# Patient Record
Sex: MALE | Race: WHITE | NOT HISPANIC OR LATINO | Employment: OTHER | URBAN - METROPOLITAN AREA
[De-identification: names, ages, dates, MRNs, and addresses within clinical notes are randomized per-mention and may not be internally consistent; named-entity substitution may affect disease eponyms.]

---

## 2020-11-06 ENCOUNTER — HOSPITAL ENCOUNTER (EMERGENCY)
Facility: CLINIC | Age: 70
Discharge: HOME | DRG: 225 | End: 2020-11-06
Attending: EMERGENCY MEDICINE | Admitting: EMERGENCY MEDICINE
Payer: MEDICARE

## 2020-11-06 ENCOUNTER — HOSPITAL ENCOUNTER (INPATIENT)
Facility: CLINIC | Age: 70
LOS: 3 days | Discharge: HOME | DRG: 225 | End: 2020-11-09
Attending: EMERGENCY MEDICINE | Admitting: HOSPITALIST
Payer: MEDICARE

## 2020-11-06 DIAGNOSIS — I49.9 VENTRICULAR ARRHYTHMIA: ICD-10-CM

## 2020-11-06 DIAGNOSIS — Z96.651 STATUS POST TOTAL RIGHT KNEE REPLACEMENT: Primary | ICD-10-CM

## 2020-11-06 DIAGNOSIS — Z53.9 ERRONEOUS ENCOUNTER--DISREGARD: ICD-10-CM

## 2020-11-06 LAB
ANION GAP SERPL CALCULATED.3IONS-SCNC: 6 MMOL/L (ref 3–14)
APTT PPP: 44 SEC (ref 22–37)
APTT PPP: 46 SEC (ref 22–37)
APTT PPP: 66 SEC (ref 22–37)
BASOPHILS # BLD AUTO: 0 10E9/L (ref 0–0.2)
BASOPHILS NFR BLD AUTO: 0.2 %
BUN SERPL-MCNC: 13 MG/DL (ref 7–30)
CALCIUM SERPL-MCNC: 8.6 MG/DL (ref 8.5–10.1)
CHLORIDE SERPL-SCNC: 106 MMOL/L (ref 94–109)
CO2 BLDCOV-SCNC: 25 MMOL/L (ref 21–28)
CO2 SERPL-SCNC: 25 MMOL/L (ref 20–32)
CREAT BLD-MCNC: 1 MG/DL (ref 0.66–1.25)
CREAT SERPL-MCNC: 1 MG/DL (ref 0.66–1.25)
DIFFERENTIAL METHOD BLD: ABNORMAL
EOSINOPHIL # BLD AUTO: 0.1 10E9/L (ref 0–0.7)
EOSINOPHIL NFR BLD AUTO: 1.4 %
ERYTHROCYTE [DISTWIDTH] IN BLOOD BY AUTOMATED COUNT: 12.7 % (ref 10–15)
ERYTHROCYTE [DISTWIDTH] IN BLOOD BY AUTOMATED COUNT: 12.9 % (ref 10–15)
GFR SERPL CREATININE-BSD FRML MDRD: 74 ML/MIN/{1.73_M2}
GFR SERPL CREATININE-BSD FRML MDRD: 76 ML/MIN/{1.73_M2}
GLUCOSE SERPL-MCNC: 187 MG/DL (ref 70–99)
HCT VFR BLD AUTO: 34.8 % (ref 40–53)
HCT VFR BLD AUTO: 34.9 % (ref 40–53)
HCT VFR BLD CALC: 34 %PCV (ref 40–53)
HGB BLD CALC-MCNC: 11.6 G/DL (ref 13.3–17.7)
HGB BLD-MCNC: 11.3 G/DL (ref 13.3–17.7)
HGB BLD-MCNC: 11.4 G/DL (ref 13.3–17.7)
IMM GRANULOCYTES # BLD: 0 10E9/L (ref 0–0.4)
IMM GRANULOCYTES NFR BLD: 0.3 %
INR PPP: 1.75 (ref 0.86–1.14)
INTERPRETATION ECG - MUSE: NORMAL
INTERPRETATION ECG - MUSE: NORMAL
LABORATORY COMMENT REPORT: NORMAL
LACTATE BLD-SCNC: 1.9 MMOL/L (ref 0.7–2)
LYMPHOCYTES # BLD AUTO: 1.3 10E9/L (ref 0.8–5.3)
LYMPHOCYTES NFR BLD AUTO: 13.4 %
MAGNESIUM SERPL-MCNC: 1.9 MG/DL (ref 1.6–2.3)
MCH RBC QN AUTO: 31.7 PG (ref 26.5–33)
MCH RBC QN AUTO: 31.8 PG (ref 26.5–33)
MCHC RBC AUTO-ENTMCNC: 32.4 G/DL (ref 31.5–36.5)
MCHC RBC AUTO-ENTMCNC: 32.8 G/DL (ref 31.5–36.5)
MCV RBC AUTO: 97 FL (ref 78–100)
MCV RBC AUTO: 98 FL (ref 78–100)
MONOCYTES # BLD AUTO: 0.9 10E9/L (ref 0–1.3)
MONOCYTES NFR BLD AUTO: 9.9 %
NEUTROPHILS # BLD AUTO: 7 10E9/L (ref 1.6–8.3)
NEUTROPHILS NFR BLD AUTO: 74.8 %
NRBC # BLD AUTO: 0 10*3/UL
NRBC BLD AUTO-RTO: 0 /100
PCO2 BLDV: 41 MM HG (ref 40–50)
PH BLDV: 7.39 PH (ref 7.32–7.43)
PLATELET # BLD AUTO: 363 10E9/L (ref 150–450)
PLATELET # BLD AUTO: 376 10E9/L (ref 150–450)
PO2 BLDV: 27 MM HG (ref 25–47)
POTASSIUM BLD-SCNC: 3.8 MMOL/L (ref 3.4–5.3)
POTASSIUM SERPL-SCNC: 3.8 MMOL/L (ref 3.4–5.3)
RBC # BLD AUTO: 3.56 10E12/L (ref 4.4–5.9)
RBC # BLD AUTO: 3.58 10E12/L (ref 4.4–5.9)
SAO2 % BLDV FROM PO2: 49 %
SARS-COV-2 RNA SPEC QL NAA+PROBE: NEGATIVE
SARS-COV-2 RNA SPEC QL NAA+PROBE: NORMAL
SODIUM BLD-SCNC: 140 MMOL/L (ref 133–144)
SODIUM SERPL-SCNC: 137 MMOL/L (ref 133–144)
SPECIMEN SOURCE: NORMAL
SPECIMEN SOURCE: NORMAL
TROPONIN I BLD-MCNC: 0 UG/L (ref 0–0.08)
TROPONIN I SERPL-MCNC: 0.03 UG/L (ref 0–0.04)
TROPONIN I SERPL-MCNC: 1.22 UG/L (ref 0–0.04)
TROPONIN I SERPL-MCNC: 1.33 UG/L (ref 0–0.04)
WBC # BLD AUTO: 8.9 10E9/L (ref 4–11)
WBC # BLD AUTO: 9.4 10E9/L (ref 4–11)

## 2020-11-06 PROCEDURE — 999N001076 HC STATISTIC SODIUM ED POCT

## 2020-11-06 PROCEDURE — 99223 1ST HOSP IP/OBS HIGH 75: CPT | Mod: AI | Performed by: HOSPITALIST

## 2020-11-06 PROCEDURE — 210N000002 HC R&B HEART CARE

## 2020-11-06 PROCEDURE — 99292 CRITICAL CARE ADDL 30 MIN: CPT

## 2020-11-06 PROCEDURE — 999N001079 HC STATISTIC HEMATOCRIT ED POCT

## 2020-11-06 PROCEDURE — 96361 HYDRATE IV INFUSION ADD-ON: CPT

## 2020-11-06 PROCEDURE — 250N000011 HC RX IP 250 OP 636: Performed by: HOSPITALIST

## 2020-11-06 PROCEDURE — 250N000013 HC RX MED GY IP 250 OP 250 PS 637: Performed by: HOSPITALIST

## 2020-11-06 PROCEDURE — 96366 THER/PROPH/DIAG IV INF ADDON: CPT

## 2020-11-06 PROCEDURE — 83735 ASSAY OF MAGNESIUM: CPT | Performed by: EMERGENCY MEDICINE

## 2020-11-06 PROCEDURE — 82803 BLOOD GASES ANY COMBINATION: CPT

## 2020-11-06 PROCEDURE — 99221 1ST HOSP IP/OBS SF/LOW 40: CPT | Performed by: INTERNAL MEDICINE

## 2020-11-06 PROCEDURE — 85610 PROTHROMBIN TIME: CPT | Performed by: EMERGENCY MEDICINE

## 2020-11-06 PROCEDURE — C9803 HOPD COVID-19 SPEC COLLECT: HCPCS

## 2020-11-06 PROCEDURE — 93005 ELECTROCARDIOGRAM TRACING: CPT | Mod: 76

## 2020-11-06 PROCEDURE — 85027 COMPLETE CBC AUTOMATED: CPT | Performed by: HOSPITALIST

## 2020-11-06 PROCEDURE — 84484 ASSAY OF TROPONIN QUANT: CPT | Performed by: EMERGENCY MEDICINE

## 2020-11-06 PROCEDURE — 80048 BASIC METABOLIC PNL TOTAL CA: CPT | Performed by: EMERGENCY MEDICINE

## 2020-11-06 PROCEDURE — 250N000011 HC RX IP 250 OP 636: Performed by: EMERGENCY MEDICINE

## 2020-11-06 PROCEDURE — 96360 HYDRATION IV INFUSION INIT: CPT | Mod: 59

## 2020-11-06 PROCEDURE — 999N001077 HC STATISTIC POTASSIUM ED POCT

## 2020-11-06 PROCEDURE — 36415 COLL VENOUS BLD VENIPUNCTURE: CPT | Performed by: HOSPITALIST

## 2020-11-06 PROCEDURE — 96365 THER/PROPH/DIAG IV INF INIT: CPT

## 2020-11-06 PROCEDURE — 999N000157 HC STATISTIC RCP TIME EA 10 MIN

## 2020-11-06 PROCEDURE — 258N000003 HC RX IP 258 OP 636: Performed by: EMERGENCY MEDICINE

## 2020-11-06 PROCEDURE — 258N000003 HC RX IP 258 OP 636: Performed by: HOSPITALIST

## 2020-11-06 PROCEDURE — 82565 ASSAY OF CREATININE: CPT

## 2020-11-06 PROCEDURE — 99291 CRITICAL CARE FIRST HOUR: CPT

## 2020-11-06 PROCEDURE — U0003 INFECTIOUS AGENT DETECTION BY NUCLEIC ACID (DNA OR RNA); SEVERE ACUTE RESPIRATORY SYNDROME CORONAVIRUS 2 (SARS-COV-2) (CORONAVIRUS DISEASE [COVID-19]), AMPLIFIED PROBE TECHNIQUE, MAKING USE OF HIGH THROUGHPUT TECHNOLOGIES AS DESCRIBED BY CMS-2020-01-R: HCPCS | Performed by: EMERGENCY MEDICINE

## 2020-11-06 PROCEDURE — 84484 ASSAY OF TROPONIN QUANT: CPT

## 2020-11-06 PROCEDURE — 83605 ASSAY OF LACTIC ACID: CPT | Performed by: EMERGENCY MEDICINE

## 2020-11-06 PROCEDURE — 85025 COMPLETE CBC W/AUTO DIFF WBC: CPT | Performed by: EMERGENCY MEDICINE

## 2020-11-06 PROCEDURE — 84484 ASSAY OF TROPONIN QUANT: CPT | Performed by: HOSPITALIST

## 2020-11-06 PROCEDURE — 85730 THROMBOPLASTIN TIME PARTIAL: CPT | Performed by: EMERGENCY MEDICINE

## 2020-11-06 PROCEDURE — 85730 THROMBOPLASTIN TIME PARTIAL: CPT | Performed by: HOSPITALIST

## 2020-11-06 PROCEDURE — 93005 ELECTROCARDIOGRAM TRACING: CPT

## 2020-11-06 RX ORDER — MIRTAZAPINE 7.5 MG/1
7.5 TABLET, FILM COATED ORAL
COMMUNITY
Start: 2020-10-14

## 2020-11-06 RX ORDER — CARVEDILOL 25 MG/1
25 TABLET ORAL DAILY
Status: DISCONTINUED | OUTPATIENT
Start: 2020-11-07 | End: 2020-11-06

## 2020-11-06 RX ORDER — NITROGLYCERIN 0.4 MG/1
0.4 TABLET SUBLINGUAL EVERY 5 MIN PRN
Status: DISCONTINUED | OUTPATIENT
Start: 2020-11-06 | End: 2020-11-09 | Stop reason: HOSPADM

## 2020-11-06 RX ORDER — LATANOPROST 50 UG/ML
1 SOLUTION/ DROPS OPHTHALMIC AT BEDTIME
Status: DISCONTINUED | OUTPATIENT
Start: 2020-11-06 | End: 2020-11-09 | Stop reason: HOSPADM

## 2020-11-06 RX ORDER — POTASSIUM CHLORIDE 1500 MG/1
20 TABLET, EXTENDED RELEASE ORAL ONCE
Status: COMPLETED | OUTPATIENT
Start: 2020-11-06 | End: 2020-11-06

## 2020-11-06 RX ORDER — POLYETHYLENE GLYCOL 3350 17 G/17G
17 POWDER, FOR SOLUTION ORAL DAILY PRN
Status: DISCONTINUED | OUTPATIENT
Start: 2020-11-06 | End: 2020-11-09 | Stop reason: HOSPADM

## 2020-11-06 RX ORDER — IPRATROPIUM BROMIDE 42 UG/1
2 SPRAY, METERED NASAL 3 TIMES DAILY PRN
COMMUNITY

## 2020-11-06 RX ORDER — UBIDECARENONE 100 MG
100 CAPSULE ORAL AT BEDTIME
COMMUNITY

## 2020-11-06 RX ORDER — CHLORAL HYDRATE 500 MG
2 CAPSULE ORAL DAILY
COMMUNITY

## 2020-11-06 RX ORDER — LORAZEPAM 1 MG/1
TABLET ORAL
COMMUNITY
Start: 2020-10-14

## 2020-11-06 RX ORDER — FLUTICASONE PROPIONATE 50 MCG
2 SPRAY, SUSPENSION (ML) NASAL
COMMUNITY
Start: 2020-02-19 | End: 2020-11-06

## 2020-11-06 RX ORDER — LOSARTAN POTASSIUM 50 MG/1
50 TABLET ORAL DAILY
Status: DISCONTINUED | OUTPATIENT
Start: 2020-11-07 | End: 2020-11-09 | Stop reason: HOSPADM

## 2020-11-06 RX ORDER — NITROGLYCERIN 0.4 MG/1
0.4 TABLET SUBLINGUAL
COMMUNITY
Start: 2019-01-03

## 2020-11-06 RX ORDER — LATANOPROST 50 UG/ML
1 SOLUTION/ DROPS OPHTHALMIC AT BEDTIME
COMMUNITY

## 2020-11-06 RX ORDER — CARBOXYMETHYLCELLULOSE SODIUM 5 MG/ML
2 SOLUTION/ DROPS OPHTHALMIC DAILY
Status: DISCONTINUED | OUTPATIENT
Start: 2020-11-06 | End: 2020-11-09 | Stop reason: HOSPADM

## 2020-11-06 RX ORDER — AMOXICILLIN 250 MG
1 CAPSULE ORAL 2 TIMES DAILY PRN
Status: DISCONTINUED | OUTPATIENT
Start: 2020-11-06 | End: 2020-11-09 | Stop reason: HOSPADM

## 2020-11-06 RX ORDER — ATORVASTATIN CALCIUM 80 MG/1
80 TABLET, FILM COATED ORAL EVERY EVENING
Status: DISCONTINUED | OUTPATIENT
Start: 2020-11-06 | End: 2020-11-09 | Stop reason: HOSPADM

## 2020-11-06 RX ORDER — ACETAMINOPHEN 650 MG/1
650 SUPPOSITORY RECTAL EVERY 4 HOURS PRN
Status: DISCONTINUED | OUTPATIENT
Start: 2020-11-06 | End: 2020-11-09 | Stop reason: HOSPADM

## 2020-11-06 RX ORDER — CARVEDILOL 25 MG/1
25 TABLET ORAL 2 TIMES DAILY WITH MEALS
Status: DISCONTINUED | OUTPATIENT
Start: 2020-11-06 | End: 2020-11-09 | Stop reason: HOSPADM

## 2020-11-06 RX ORDER — ASPIRIN 81 MG/1
81 TABLET ORAL DAILY
Status: DISCONTINUED | OUTPATIENT
Start: 2020-11-07 | End: 2020-11-09 | Stop reason: HOSPADM

## 2020-11-06 RX ORDER — ACETAMINOPHEN 325 MG/1
650 TABLET ORAL EVERY 4 HOURS PRN
Status: DISCONTINUED | OUTPATIENT
Start: 2020-11-06 | End: 2020-11-07

## 2020-11-06 RX ORDER — MULTIVIT WITH MINERALS/LUTEIN
2000 TABLET ORAL DAILY
COMMUNITY

## 2020-11-06 RX ORDER — LANOLIN ALCOHOL/MO/W.PET/CERES
2000 CREAM (GRAM) TOPICAL DAILY
COMMUNITY

## 2020-11-06 RX ORDER — LOSARTAN POTASSIUM 50 MG/1
50 TABLET ORAL
COMMUNITY
Start: 2020-09-18

## 2020-11-06 RX ORDER — IPRATROPIUM BROMIDE 42 UG/1
2 SPRAY, METERED NASAL 3 TIMES DAILY PRN
Status: DISCONTINUED | OUTPATIENT
Start: 2020-11-06 | End: 2020-11-09 | Stop reason: HOSPADM

## 2020-11-06 RX ORDER — AMLODIPINE BESYLATE 5 MG/1
5 TABLET ORAL DAILY
Status: DISCONTINUED | OUTPATIENT
Start: 2020-11-07 | End: 2020-11-08

## 2020-11-06 RX ORDER — AMLODIPINE BESYLATE 5 MG/1
5 TABLET ORAL
COMMUNITY
Start: 2020-09-18

## 2020-11-06 RX ORDER — ONDANSETRON 4 MG/1
4 TABLET, ORALLY DISINTEGRATING ORAL EVERY 6 HOURS PRN
Status: DISCONTINUED | OUTPATIENT
Start: 2020-11-06 | End: 2020-11-09 | Stop reason: HOSPADM

## 2020-11-06 RX ORDER — CARVEDILOL 25 MG/1
25 TABLET ORAL
COMMUNITY
Start: 2020-09-18

## 2020-11-06 RX ORDER — NALOXONE HYDROCHLORIDE 0.4 MG/ML
.1-.4 INJECTION, SOLUTION INTRAMUSCULAR; INTRAVENOUS; SUBCUTANEOUS
Status: DISCONTINUED | OUTPATIENT
Start: 2020-11-06 | End: 2020-11-09 | Stop reason: HOSPADM

## 2020-11-06 RX ORDER — HEPARIN SODIUM 10000 [USP'U]/100ML
0-5000 INJECTION, SOLUTION INTRAVENOUS CONTINUOUS
Status: DISCONTINUED | OUTPATIENT
Start: 2020-11-06 | End: 2020-11-07

## 2020-11-06 RX ORDER — ACETAMINOPHEN 500 MG
500-1000 TABLET ORAL 3 TIMES DAILY PRN
COMMUNITY

## 2020-11-06 RX ORDER — MAGNESIUM OXIDE 400 MG/1
400 TABLET ORAL EVERY EVENING
Status: DISCONTINUED | OUTPATIENT
Start: 2020-11-06 | End: 2020-11-09 | Stop reason: HOSPADM

## 2020-11-06 RX ORDER — OMEPRAZOLE 40 MG/1
40 CAPSULE, DELAYED RELEASE ORAL
COMMUNITY
Start: 2020-10-14

## 2020-11-06 RX ORDER — OXYCODONE HYDROCHLORIDE 5 MG/1
5 TABLET ORAL 3 TIMES DAILY PRN
COMMUNITY

## 2020-11-06 RX ORDER — CARBOXYMETHYLCELLULOSE SODIUM 5 MG/ML
2 SOLUTION/ DROPS OPHTHALMIC DAILY
COMMUNITY

## 2020-11-06 RX ORDER — MULTIVITAMIN,THERAPEUTIC
1 TABLET ORAL DAILY
COMMUNITY

## 2020-11-06 RX ORDER — LORAZEPAM 0.5 MG/1
0.5 TABLET ORAL
Status: DISCONTINUED | OUTPATIENT
Start: 2020-11-06 | End: 2020-11-09 | Stop reason: HOSPADM

## 2020-11-06 RX ORDER — OXYCODONE HYDROCHLORIDE 5 MG/1
5 TABLET ORAL 3 TIMES DAILY PRN
Status: DISCONTINUED | OUTPATIENT
Start: 2020-11-06 | End: 2020-11-08

## 2020-11-06 RX ORDER — ONDANSETRON 2 MG/ML
4 INJECTION INTRAMUSCULAR; INTRAVENOUS EVERY 6 HOURS PRN
Status: DISCONTINUED | OUTPATIENT
Start: 2020-11-06 | End: 2020-11-09 | Stop reason: HOSPADM

## 2020-11-06 RX ORDER — ATORVASTATIN CALCIUM 80 MG/1
80 TABLET, FILM COATED ORAL
COMMUNITY
Start: 2020-09-18

## 2020-11-06 RX ORDER — LIDOCAINE 40 MG/G
CREAM TOPICAL
Status: DISCONTINUED | OUTPATIENT
Start: 2020-11-06 | End: 2020-11-09 | Stop reason: HOSPADM

## 2020-11-06 RX ORDER — ASPIRIN 81 MG/1
81 TABLET ORAL DAILY
COMMUNITY

## 2020-11-06 RX ORDER — LANOLIN ALCOHOL/MO/W.PET/CERES
2000 CREAM (GRAM) TOPICAL DAILY
Status: DISCONTINUED | OUTPATIENT
Start: 2020-11-07 | End: 2020-11-09 | Stop reason: HOSPADM

## 2020-11-06 RX ORDER — SODIUM CHLORIDE 9 MG/ML
INJECTION, SOLUTION INTRAVENOUS ONCE
Status: COMPLETED | OUTPATIENT
Start: 2020-11-06 | End: 2020-11-06

## 2020-11-06 RX ORDER — AMOXICILLIN 250 MG
2 CAPSULE ORAL 2 TIMES DAILY PRN
Status: DISCONTINUED | OUTPATIENT
Start: 2020-11-06 | End: 2020-11-09 | Stop reason: HOSPADM

## 2020-11-06 RX ORDER — MIRTAZAPINE 7.5 MG/1
7.5 TABLET, FILM COATED ORAL AT BEDTIME
Status: DISCONTINUED | OUTPATIENT
Start: 2020-11-06 | End: 2020-11-09 | Stop reason: HOSPADM

## 2020-11-06 RX ADMIN — AMIODARONE HYDROCHLORIDE 1 MG/MIN: 50 INJECTION, SOLUTION INTRAVENOUS at 17:08

## 2020-11-06 RX ADMIN — ATORVASTATIN CALCIUM 80 MG: 80 TABLET, FILM COATED ORAL at 20:55

## 2020-11-06 RX ADMIN — SODIUM CHLORIDE 1000 ML: 9 INJECTION, SOLUTION INTRAVENOUS at 12:44

## 2020-11-06 RX ADMIN — SODIUM CHLORIDE 125 ML/HR: 9 INJECTION, SOLUTION INTRAVENOUS at 13:10

## 2020-11-06 RX ADMIN — LATANOPROST 1 DROP: 50 SOLUTION/ DROPS OPHTHALMIC at 20:56

## 2020-11-06 RX ADMIN — POTASSIUM CHLORIDE 20 MEQ: 1500 TABLET, EXTENDED RELEASE ORAL at 16:19

## 2020-11-06 RX ADMIN — HEPARIN SODIUM 1150 UNITS/HR: 10000 INJECTION, SOLUTION INTRAVENOUS at 17:09

## 2020-11-06 RX ADMIN — AMIODARONE HYDROCHLORIDE 0.5 MG/MIN: 50 INJECTION, SOLUTION INTRAVENOUS at 22:41

## 2020-11-06 RX ADMIN — AMIODARONE HYDROCHLORIDE 1 MG/MIN: 50 INJECTION, SOLUTION INTRAVENOUS at 12:47

## 2020-11-06 ASSESSMENT — ENCOUNTER SYMPTOMS
SHORTNESS OF BREATH: 0
SORE THROAT: 0
RHINORRHEA: 0
FEVER: 0
COUGH: 0
WOUND: 1

## 2020-11-06 ASSESSMENT — MIFFLIN-ST. JEOR: SCORE: 1664.75

## 2020-11-06 NOTE — ED PROVIDER NOTES
"  History     Chief Complaint:  Chest Pain      The history is provided by the patient and the EMS personnel.      Candido \"Brandon\" LAMINE Doe is a 70 year old male with a history of STEMI in 2014, hypertension, and atrial fibrillation on Xarelto who presents with chest pain. Brandon developed chest heaviness about 1-1.5 hours prior to arrival. He was hypotensive on his home BP cuff so he called his primary care provider who recommended he and his wife called paramedics.  When paramedics arrived Brandon was in a wide-complex ventricular tachycardia with systolic blood pressure in the 80s. First they gave him 12 mg of adenosine which had no effect so they attempted cardioversion at 100 joules and Brandon went into ventricular fibrillation. They then defibrillated 3 times with conversion to sinus rhythm with frequent runs of VT. He was given 150 mg of amiodarone then had fewer and fewer episodes of ventricular ectopy. He never required CPR.    Brandon took 324 mg of aspirin prior to EMS arrival. He is currently feeling well without chest pain. He denies shortness of breath, fever, cough, rhinorrhea, or sore throat. He had a right knee arthroscopy on October 22; he stopped his Xarelto temporarily for this procedure but has since restarted this.     Allergies:  Snails    Medications:    Aspirin 81 mg  Nitrostat   Xarelto   Losartan   Norvasc   Lipitor   Coreg   Prilosec   Ativan      Past Medical History:    Inferior STEMI   Paroxysmal atrial fibrillation   Michele's esophagus   ASCVD   Hypertension   Hyperlipidemia   Ventricular tachycardia   GERD   Diverticulosis     Past Surgical History:    Cholecystectomy   Umbilical hernia repair   Coronary stent placement   Knee arthroscopy-left   Vasectomy     Family History:    No past pertinent family history.    Social History:  Presents to the ED with: Wife  Tobacco use: Former pipe smoker, quit 1982  Alcohol use: 1 beer/week  Drug use: No  PCP: Alex Moore   Occupation:  " "  Marital Status:       Review of Systems   Constitutional: Negative for fever.   HENT: Negative for rhinorrhea and sore throat.    Respiratory: Negative for cough and shortness of breath.    Cardiovascular: Positive for chest pain (resolved).   Skin: Positive for wound (surgical, right knee).   All other systems reviewed and are negative.    Physical Exam     Patient Vitals for the past 24 hrs:   BP Temp Temp src Pulse Resp SpO2 Height Weight   11/06/20 1541 125/78 -- -- 58 15 98 % -- --   11/06/20 1515 122/72 -- -- 57 19 94 % -- --   11/06/20 1512 -- 97.5  F (36.4  C) Oral -- -- -- -- --   11/06/20 1500 123/74 -- -- 61 14 96 % -- --   11/06/20 1445 131/79 -- -- 60 12 98 % -- --   11/06/20 1430 129/81 -- -- 57 16 98 % -- --   11/06/20 1420 -- -- -- 57 14 -- -- --   11/06/20 1415 119/72 -- -- 61 23 97 % -- --   11/06/20 1400 112/69 -- -- 61 21 97 % -- --   11/06/20 1350 116/74 -- -- 62 18 97 % -- --   11/06/20 1340 113/71 -- -- 64 19 96 % -- --   11/06/20 1330 114/74 -- -- 65 18 97 % -- --   11/06/20 1325 108/70 -- -- 63 11 99 % -- --   11/06/20 1320 111/76 -- -- 64 14 98 % -- --   11/06/20 1315 116/75 -- -- 65 17 97 % -- --   11/06/20 1310 109/72 -- -- 65 13 98 % -- --   11/06/20 1305 113/70 -- -- 64 11 98 % -- --   11/06/20 1300 113/70 -- -- 66 12 98 % -- --   11/06/20 1255 110/73 -- -- 65 10 98 % -- --   11/06/20 1250 110/67 -- -- 64 18 98 % -- --   11/06/20 1245 112/76 -- -- 64 15 98 % -- --   11/06/20 1240 117/72 -- -- 64 15 99 % -- --   11/06/20 1235 117/82 -- -- 61 16 97 % -- --   11/06/20 1230 118/84 95.9  F (35.5  C) Temporal 62 20 98 % 1.676 m (5' 6\") 96.7 kg (213 lb 3 oz)       Physical Exam  General: Well-developed and well-nourished. Well appearing elderly  man. Cooperative.  Head:  Atraumatic.  Eyes:  Conjunctivae, lids, and sclerae are normal.  Neck:  Supple. Normal range of motion.  CV:  Regular rate and rhythm. Normal heart sounds with no murmurs, rubs, or gallops " detected.  Resp:  No respiratory distress. Clear to auscultation bilaterally without decreased breath sounds, wheezing, rales, or rhonchi.  GI:  Soft. Non-distended. Non-tender.    MS:  Normal ROM. No bilateral lower extremity edema.  Skin:  Warm. Non-diaphoretic. No pallor. Well healing right knee surgical incision without cellulitis.  Neuro:  Awake. A&Ox3. Normal strength.  Psych: Normal mood and affect. Normal speech.  Vitals reviewed.    Emergency Department Course     EKG #1  Indication: Chest Pain  Time: 1227  Rate 62 bpm. DE interval 166. QRS duration 96. QT/QTc 430/436.   Normal sinus rhythm. Possible left atrial enlargement. Inferior infarct, age undetermined.   No acute ST changes.  No changes as compared to prior, dated 8/19/14.    EKG #2  Indication: Chest Pain  Time: 1238  Rate 63 bpm. DE interval 206. QRS duration 112. QT/QTc 436/446.   Normal sinus rhythm. Minimal voltage criteria for LVH, age undetermined. Abnormal ECG.   No acute ST changes.  No changes as compared to prior, dated 11/6/2020.    Laboratory:  Asymptomatic COVID-19 Virus PCR Nasopharyngeal swab: pending     CBC: WBC: 9.4, HGB: 11.3 (L), PLT: 376  BMP: Glucose 187 (H), o/w WNL (Creatinine: 1.00)    Troponin (1240): 0.034  Lactic acid (1241): 1.9    ISTAT VBG and oxyhgb: pH 7.39 / PCO2 41 / PO2 27 / Bicarb 25 / HGB: 11.6 (L) / Hematocrit: 34 (L)     INR: 1.75 (H)  PTT: 46 (H)   Magnesium: 1.9    Creatinine POCT: Creatinine: 1.0, GFR: 74  Troponin POCT: 0.00    Interventions:  1244 NS 1L IV   1247 Nexterone 1 mg/min IV infusion   1310 NS IV infusion 125 mL/hr    Emergency Department Course:  Nursing notes and vitals reviewed. (1222) I performed an exam of the patient as documented above.     IV inserted. Medicine administered as documented above. Blood drawn.  This was sent to the lab for further testing, results above.    EKG obtained in the ED, see results above.     (1243) I consulted with Dr. Lofton, cardiology, regarding the  patient's history and presentation here in the emergency department.    (1246) I rechecked the patient and discussed the results of his workup thus far.     (1411)  I consulted with Dr. Roche of the hospitalist services. She is in agreement to accept the patient for admission.    (1500) Brandon was rechecked.  He is doing well and has several questions regarding cardiology's recommendations.    Findings and plan explained to the patient who consents to admission. Discussed the patient with Dr. Roche who will admit the patient to a CCU bed for further monitoring, evaluation, and treatment.     Impression & Plan      Covid-19  Candido Doe was evaluated during a global COVID-19 pandemic, which necessitated consideration that the patient might be at risk for infection with the SARS-CoV-2 virus that causes COVID-19.   Applicable protocols for evaluation were followed during the patient's care.   COVID-19 was considered as part of the patient's evaluation. The plan for testing is:  a test was obtained during this visit.    Medical Decision Making:  Brandon is a 70-year-old man with a history of MI who developed chest heaviness and reported hypotension prompting call to paramedics after he took aspirin.  Paramedics found him to have a wide-complex tachycardia with hypotension to the 80s systolic that did not resolve with adenosine and then ventricular fibrillation when they attempted cardioversion.  He then required defibrillation x3 before returning to sinus rhythm.  He had several runs of ventricular tachycardia after this although these became fewer and fewer after he was given 150 mg of amiodarone.  By the time of arrival, Brandon's chest pain has resolved and states he is feeling well.  He appears well on exam and has rare ventricular ectopy with the longest run of VT I am aware of being only 7 beats.  I started him on amiodarone infusion and discussed his case with cardiology.  Because his EKG at this time reveals no  ectopy or ischemic changes, Dr. Lofton has no further recommendations but suggests the patient will likely require angiogram.  Brandon's troponin is negative but detectable at 0.034.  He has no electrolyte derangements, kidney injury, lactic acidosis, or leukocytosis.  Abnormal PTT/INR may be related to his use of Xarelto.  He was given IV fluids but required no further interventions.  I updated the patient on findings and plan and answered all his questions to the best of my ability although he has several that are more specific for cardiology.  I discussed his case with Dr. Roche, hospitalist, who accepts admission and has no further orders.    Diagnosis:    ICD-10-CM    1. Ventricular arrhythmia  I49.9        Disposition:  Admitted to Dr. Roche    Scribe Disclosure:  I, Anita Vigil, am serving as a scribe on 11/6/2020 at 1:01 PM to personally document services performed by Lisa Post MD based on my observations and the provider's statements to me.     Anita Vigil  11/6/2020   [facility] EMERGENCY DEPT       Lisa Post MD  11/06/20 2385

## 2020-11-06 NOTE — ED NOTES
Pt placed on all monitors including Zoll. Pt states he took 4 baby ASA at home when EMS was there. Pt had right knee surgery on 10/22. RN getting 2nd IV site.

## 2020-11-06 NOTE — CONSULTS
"Cardiology Consult Note          Assessment and Plan:      VT/VF   Cardiac Arrest  H/o CAD  Prior MI with cardiac arrest     Recommend angiogram tomorrow  If negative will need ICD  Continue Heparin,   Continue Amiodarone gtt  Hold Zarelto                 Interval History:     Candido Mcfadden is a very pleasant 70-year-old gentleman [place] who has a prior history of coronary disease had a myocardial infarction  with a cardiac arrest on the table.  He had 2 [employer] stents placed 1 to right coronary artery and 1 to the PDA LAD.  He has done very well since then with his ejection fraction improving from 30 to 35% to 50 to 55% on 1 month ago.  He has had no recurrent episodes and no evidence of heart failure further cardiac arrhythmias or myocardial infarction.    He had knee surgery done 2 weeks ago and has been recovering from that when today he was up and about had chest discomfort followed by subsequent wide-complex tachycardia paramedics arrived gave him IV adenosine without effect and subsequant defibrillation went into ventricular tachycardia followed by subsequent second defibrillation.              Review of Systems:   As per subjective, otherwise 5 systems reviewed and negative.           Physical Exam:   Blood pressure 128/74, pulse 51, temperature 97.8  F (36.6  C), temperature source Oral, resp. rate 20, height 1.676 m (5' 6\"), weight 96.7 kg (213 lb 3 oz), SpO2 98 %.      Vital Sign Ranges  Temperature Temp  Av.1  F (36.2  C)  Min: 95.9  F (35.5  C)  Max: 97.8  F (36.6  C)   Blood pressure Systolic (24hrs), Av , Min:108 , Max:131        Diastolic (24hrs), Av, Min:67, Max:84      Pulse Pulse  Av.4  Min: 51  Max: 66   Respirations Resp  Avg: 15.8  Min: 10  Max: 23   Pulse oximetry SpO2  Av.4 %  Min: 94 %  Max: 99 %         Intake/Output Summary (Last 24 hours) at 2020 1705  Last data filed at 2020 1305  Gross per 24 hour   Intake 1000 ml   Output -- "   Net 1000 ml       Constitutional:   NAD   Skin:   Warm and dry   Head:   Nontraumatic   Neck:   Supple, symmetrical, trachea midline, no adenopathy, thyroid symmetric, not enlarged and no tenderness, skin normal   Lungs:   normal   Cardiovascular:   Normal apical impulse, regular rate and rhythm, normal S1 and S2, no S3 or S4, and no murmur noted    Abdomen:   Benign   Extremities and Back:   Symmetric, no curvature, spinous processes are non-tender on palpation, paraspinous muscles are non-tender on palpation, no costal vertebral tenderness   Neurological:   Grossly nonfocal            Medications:     No current outpatient medications on file.                Data:     Results for orders placed or performed during the hospital encounter of 11/06/20 (from the past 24 hour(s))   EKG 12-lead, tracing only   Result Value Ref Range    Interpretation ECG Click View Image link to view waveform and result    CBC with platelets differential   Result Value Ref Range    WBC 9.4 4.0 - 11.0 10e9/L    RBC Count 3.56 (L) 4.4 - 5.9 10e12/L    Hemoglobin 11.3 (L) 13.3 - 17.7 g/dL    Hematocrit 34.9 (L) 40.0 - 53.0 %    MCV 98 78 - 100 fl    MCH 31.7 26.5 - 33.0 pg    MCHC 32.4 31.5 - 36.5 g/dL    RDW 12.9 10.0 - 15.0 %    Platelet Count 376 150 - 450 10e9/L    Diff Method Automated Method     % Neutrophils 74.8 %    % Lymphocytes 13.4 %    % Monocytes 9.9 %    % Eosinophils 1.4 %    % Basophils 0.2 %    % Immature Granulocytes 0.3 %    Nucleated RBCs 0 0 /100    Absolute Neutrophil 7.0 1.6 - 8.3 10e9/L    Absolute Lymphocytes 1.3 0.8 - 5.3 10e9/L    Absolute Monocytes 0.9 0.0 - 1.3 10e9/L    Absolute Eosinophils 0.1 0.0 - 0.7 10e9/L    Absolute Basophils 0.0 0.0 - 0.2 10e9/L    Abs Immature Granulocytes 0.0 0 - 0.4 10e9/L    Absolute Nucleated RBC 0.0    INR   Result Value Ref Range    INR 1.75 (H) 0.86 - 1.14   Partial thromboplastin time   Result Value Ref Range    PTT 46 (H) 22 - 37 sec   Basic metabolic panel   Result Value  Ref Range    Sodium 137 133 - 144 mmol/L    Potassium 3.8 3.4 - 5.3 mmol/L    Chloride 106 94 - 109 mmol/L    Carbon Dioxide 25 20 - 32 mmol/L    Anion Gap 6 3 - 14 mmol/L    Glucose 187 (H) 70 - 99 mg/dL    Urea Nitrogen 13 7 - 30 mg/dL    Creatinine 1.00 0.66 - 1.25 mg/dL    GFR Estimate 76 >60 mL/min/[1.73_m2]    GFR Estimate If Black 88 >60 mL/min/[1.73_m2]    Calcium 8.6 8.5 - 10.1 mg/dL   Troponin I   Result Value Ref Range    Troponin I ES 0.034 0.000 - 0.045 ug/L   Magnesium   Result Value Ref Range    Magnesium 1.9 1.6 - 2.3 mg/dL   Lactic acid whole blood   Result Value Ref Range    Lactic Acid 1.9 0.7 - 2.0 mmol/L   ISTAT gases elec carlos POCT   Result Value Ref Range    Ph Venous 7.39 7.32 - 7.43 pH    PCO2 Venous 41 40 - 50 mm Hg    PO2 Venous 27 25 - 47 mm Hg    Bicarbonate Venous 25 21 - 28 mmol/L    O2 Sat Venous 49 %    Sodium 140 133 - 144 mmol/L    Potassium 3.8 3.4 - 5.3 mmol/L    Hemoglobin 11.6 (L) 13.3 - 17.7 g/dL    Hematocrit - POCT 34 (L) 40.0 - 53.0 %PCV   Asymptomatic COVID-19 Virus (Coronavirus) by PCR    Specimen: Nasopharyngeal   Result Value Ref Range    COVID-19 Virus PCR to [facility] - Source Nasopharyngeal     COVID-19 Virus PCR to [facility] - Result       Test received-See reflex to IDDL test SARS CoV2 (COVID-19) Virus RT-PCR

## 2020-11-06 NOTE — ED NOTES
Updating pt and family on plan of care. Pt intermittent continues to have asymptomatic runs of V-Tach.

## 2020-11-06 NOTE — ED TRIAGE NOTES
Wife called 911 for pt's complaints of chest tightness and pressure and at approx 1030. Wife states pt's BP was low.

## 2020-11-06 NOTE — ED NOTES
Pt moved to room 21. Placed back on all monitors. Hospitalist at bedside. Report to KIERA Fletcher.

## 2020-11-06 NOTE — PHARMACY-ADMISSION MEDICATION HISTORY
Pharmacy Medication History  Admission medication history interview status for the 11/6/2020  admission is complete. See EPIC admission navigator for prior to admission medications     Location of Interview: Patient room  Medication history sources: Patient's family/friend (wife), patient's medlist, [employer]  Medication history source reliability: Good  Adherence assessment: Good    Significant changes made to the medication list:  Vitamin B12, Vitamin C, Biothera, ASA 81 mg, oxycodone prn, acetaminophen prn, ipratropium nasal spray, refresh tears, CoQ10 were added.      Medication reconciliation completed by provider prior to medication history? No    Time spent in this activity: 20 minutes      Prior to Admission medications    Medication Sig Last Dose Taking? Auth Provider   acetaminophen (TYLENOL) 500 MG tablet Take 500-1,000 mg by mouth 3 times daily as needed for mild pain 11/6/2020 at am Yes Unknown, Entered By History   amLODIPine (NORVASC) 5 MG tablet Take 5 mg by mouth 11/6/2020 at am Yes Reported, Patient   aspirin 81 MG EC tablet Take 81 mg by mouth daily 11/6/2020 at am Yes Unknown, Entered By History   atorvastatin (LIPITOR) 80 MG tablet Take 80 mg by mouth 11/5/2020 at hs Yes Reported, Patient   carboxymethylcellulose PF (REFRESH PLUS) 0.5 % ophthalmic solution Place 2 drops into both eyes daily 11/6/2020 at am Yes Unknown, Entered By History   carvedilol (COREG) 25 MG tablet Take 25 mg by mouth 11/6/2020 at am Yes Reported, Patient   cholecalciferol 50 MCG (2000 UT) tablet Take 1 tablet by mouth 11/6/2020 at am Yes Reported, Patient   co-enzyme Q-10 100 MG CAPS capsule Take 100 mg by mouth At Bedtime 11/5/2020 at hs Yes Unknown, Entered By History   cyanocobalamin (VITAMIN B-12) 1000 MCG tablet Take 2,000 mcg by mouth daily 11/6/2020 at am Yes Unknown, Entered By History   fish oil-omega-3 fatty acids 1000 MG capsule Take 2 g by mouth daily  11/6/2020 at am Yes Reported, Patient   ipratropium  (ATROVENT) 0.06 % nasal spray Spray 2 sprays into both nostrils 3 times daily as needed for rhinitis Past Week at Unknown time Yes Unknown, Entered By History   latanoprost (XALATAN) 0.005 % ophthalmic solution Place 1 drop into both eyes At Bedtime  Yes Unknown, Entered By History   LORazepam (ATIVAN) 1 MG tablet Take 1 to 2 tablets by mouth at bedtime as needed. Past Week at Unknown time Yes Reported, Patient   losartan (COZAAR) 50 MG tablet Take 50 mg by mouth 11/6/2020 at am Yes Reported, Patient   magnesium oxide (MAG-OX) 400 (241.3 Mg) MG tablet Take 400 mg by mouth 11/5/2020 at hs Yes Reported, Patient   mirtazapine (REMERON) 7.5 MG tablet Take 7.5 mg by mouth 11/5/2020 at hs Yes Reported, Patient   multivitamin, therapeutic (THERA-VIT) TABS tablet Take 1 tablet by mouth daily 11/6/2020 at am Yes Unknown, Entered By History   nitroGLYcerin (NITROSTAT) 0.4 MG sublingual tablet Place 0.4 mg under the tongue  at prn Yes Reported, Patient   NONFORMULARY Take 1 capsule by mouth daily Biothera immune supplement 11/6/2020 at am Yes Unknown, Entered By History   omeprazole (PRILOSEC) 40 MG DR capsule Take 40 mg by mouth 11/6/2020 at am Yes Reported, Patient   oxyCODONE (ROXICODONE) 5 MG tablet Take 5 mg by mouth 3 times daily as needed for severe pain 11/6/2020 at am Yes Unknown, Entered By History   rivaroxaban ANTICOAGULANT (XARELTO) 20 MG TABS tablet Take 20 mg by mouth 11/5/2020 at hs Yes Reported, Patient   vitamin C (ASCORBIC ACID) 1000 MG TABS Take 2,000 mg by mouth daily 11/6/2020 at am Yes Unknown, Entered By History

## 2020-11-06 NOTE — ED NOTES
Pt awake and talking. Answering questions appropriately. Oxygen decreased to 3L by respiratory therapist.

## 2020-11-06 NOTE — H&P
[facility]    History and Physical  Hospitalist       Date of Admission:  11/6/2020    Assessment & Plan   Candido Doe is a 70 year old male who presents with chest pain, wide complex rhythm seen on EKG, defibrillated back into sinus rhythm with improvement in rhythm seen with initiation of amiodarone.    Wide complex v tach s/p cardioversion x3  Reported 1 hr onset of chest tightness/heaviness, called PCP who recommended EMS transport to hospital. Patient was noted to be hypotensive on home BP cuff (SBP 70s per wife). EMS gave 12mg adenosine without conversion, then cardioverted x3, after first shock was pulseless v-fib then after 2 more shocks eventual return to sinus rhythm with occasional vtach pulses. Given amiodarone bolus, then started on gtt with stabilization of rhythm to sinus. Trop 0.034. Patient reports resolution of chest tightness. Strips reviewed show wide complex v tach, then sinus with occasional wide complex bursts. EKG just sinus.  - Admit inpatient  - Continue amiodarone gtt at 1mg/hr for 6 total hours, then down to 0.5mg/hr for 18hrs  - Cardiology consult  - telemetry  - Keep K>4, Mag>2 (resumed PO mag, ordered 20meq KCl 11/6)  - heparin gtt while holding xarelto  - prn nitroglycerin  - Defer NPO order to cardiologist for angiogram (last xarelto received 11/5 evening)  - troponin trend    Atrial fibrillation  Hypertension  Hx STEMI s/p PCI with ROGELIO to RCA 8/2014  Echo 10/12/20: EF 50-55%, inferior wall and posterior wall abnormal, enlarged left atrium. Mitral valve sclerotic, trace mitral regurg, ascending aorta dilated to 3.9cm, aortic sinus 4cm.  PTA amlodipine 5mg daily, coreg 25mg BID, losartan 50mg daily, asa 81mg daily, xarelto 20mg daily. lipitor 80mg daily  - Hold Xarelto for possible angiogram this weekend  - Hold amlodipine given low BPs noted prior to admit  - Continue PTA coreg, losartan, ASA, lipitor  - Telemetry  - Limited echo    Recent right  "knee replacement  ~2 weeks prior. Following with outpatient therapies, last seen on 11/5  - PT consult here    Anemia  Presume secondary to recent blood loss anemia with right knee replacement  - Hgb 11.6, trend with CBC in AM    Obstructive sleep apnea   Has not been using his CPAP in the past 2 weeks, does not want it while here.    GERD  PTA omeprazole 40mg daily, continue while inpatient    Depression  PTA remeron at bedtime, continued    Asymptomatic COVID-19 screening  No symptoms. Screened as asymptomatic, urgent for procedure  - Low suspicion    DVT Prophylaxis: heparin gtt  Code Status: Full Code  Expected discharge: 3-4 days, recommended to prior living arrangement once cardiac work-up completed    Bria Roche,     Primary Care Physician   Alex Moore    Chief Complaint   Chest heaviness    History is obtained from the patient, wife and prior medical record    History of Present Illness   Candido Doe is a 70 year old male who presents with chest tightness/heaviness 1.5 hr prior to presentation to ED. He had never had this before, even when he had his STEMI previously, he only had GI type symptoms. He woke up, felt normal, showered, then needed to rest and reported chest tightness. BP checked and SBP was in 70s per wife. Wife called PCP and was recommended to go to hospital. EMS arrived, found patient to be in wide complex vtach. Given 12mg adenosine, no response, then had synchronized cardioversion x3, with return to sinus. Given 150mg bolus amiodarone then, and continued on gtt in ED. Stabilization of heart rhythm thereafter and resolution of chest tightness (\"basically gone\").  No chest pain, shortness of breath, cough, sore throat, nausea, vomiting, abdominal pain, diarrhea, constipation, dizziness, lightheadedness, numbness, tingling  *Patient would prefer care with attending cardiologist not with trainees.    Past Medical History    I have reviewed this patient's medical history and " updated it with pertinent information if needed.   Past Medical History:   Diagnosis Date     Atrial fibrillation (H)      CAD (coronary artery disease)      Gastroesophageal reflux disease with esophagitis without hemorrhage      Hyperlipidemia LDL goal <100      Hypertension      Myocardial infarction (H)      Sleep apnea        Past Surgical History   I have reviewed this patient's surgical history and updated it with pertinent information if needed.  Past Surgical History:   Procedure Laterality Date     ARTHROPLASTY PATELLO-FEMORAL (KNEE)  11/2020     CARDIAC SURGERY      2 stents for MI     CHOLECYSTECTOMY       GENITOURINARY SURGERY      vasectomy     HERNIA REPAIR       ORTHOPEDIC SURGERY      left hip     right knee replacement Right        Prior to Admission Medications   Prior to Admission Medications   Prescriptions Last Dose Informant Patient Reported? Taking?   LORazepam (ATIVAN) 1 MG tablet Past Week at Unknown time Spouse/Significant Other Yes Yes   Sig: Take 1 to 2 tablets by mouth at bedtime as needed.   NONFORMULARY 11/6/2020 at am Spouse/Significant Other Yes Yes   Sig: Take 1 capsule by mouth daily [employer] immune supplement   acetaminophen (TYLENOL) 500 MG tablet 11/6/2020 at am Spouse/Significant Other Yes Yes   Sig: Take 500-1,000 mg by mouth 3 times daily as needed for mild pain   amLODIPine (NORVASC) 5 MG tablet 11/6/2020 at am Spouse/Significant Other Yes Yes   Sig: Take 5 mg by mouth   aspirin 81 MG EC tablet 11/6/2020 at am Spouse/Significant Other Yes Yes   Sig: Take 81 mg by mouth daily   atorvastatin (LIPITOR) 80 MG tablet 11/5/2020 at hs Spouse/Significant Other Yes Yes   Sig: Take 80 mg by mouth   carboxymethylcellulose PF (REFRESH PLUS) 0.5 % ophthalmic solution 11/6/2020 at am Spouse/Significant Other Yes Yes   Sig: Place 2 drops into both eyes daily   carvedilol (COREG) 25 MG tablet 11/6/2020 at am Spouse/Significant Other Yes Yes   Sig: Take 25 mg by mouth   cholecalciferol 50  MCG (2000 UT) tablet 11/6/2020 at am Spouse/Significant Other Yes Yes   Sig: Take 1 tablet by mouth   co-enzyme Q-10 100 MG CAPS capsule 11/5/2020 at hs Spouse/Significant Other Yes Yes   Sig: Take 100 mg by mouth At Bedtime   cyanocobalamin (VITAMIN B-12) 1000 MCG tablet 11/6/2020 at am Spouse/Significant Other Yes Yes   Sig: Take 2,000 mcg by mouth daily   fish oil-omega-3 fatty acids 1000 MG capsule 11/6/2020 at am Spouse/Significant Other Yes Yes   Sig: Take 2 g by mouth daily    ipratropium (ATROVENT) 0.06 % nasal spray Past Week at Unknown time Spouse/Significant Other Yes Yes   Sig: Spray 2 sprays into both nostrils 3 times daily as needed for rhinitis   latanoprost (XALATAN) 0.005 % ophthalmic solution  Spouse/Significant Other Yes Yes   Sig: Place 1 drop into both eyes At Bedtime   losartan (COZAAR) 50 MG tablet 11/6/2020 at am Spouse/Significant Other Yes Yes   Sig: Take 50 mg by mouth   magnesium oxide (MAG-OX) 400 (241.3 Mg) MG tablet 11/5/2020 at hs Spouse/Significant Other Yes Yes   Sig: Take 400 mg by mouth   mirtazapine (REMERON) 7.5 MG tablet 11/5/2020 at hs Spouse/Significant Other Yes Yes   Sig: Take 7.5 mg by mouth   multivitamin, therapeutic (THERA-VIT) TABS tablet 11/6/2020 at am  Yes Yes   Sig: Take 1 tablet by mouth daily   nitroGLYcerin (NITROSTAT) 0.4 MG sublingual tablet  at prn Spouse/Significant Other Yes Yes   Sig: Place 0.4 mg under the tongue   omeprazole (PRILOSEC) 40 MG DR capsule 11/6/2020 at am Spouse/Significant Other Yes Yes   Sig: Take 40 mg by mouth   oxyCODONE (ROXICODONE) 5 MG tablet 11/6/2020 at am Spouse/Significant Other Yes Yes   Sig: Take 5 mg by mouth 3 times daily as needed for severe pain   rivaroxaban ANTICOAGULANT (XARELTO) 20 MG TABS tablet 11/5/2020 at hs Spouse/Significant Other Yes Yes   Sig: Take 20 mg by mouth   vitamin C (ASCORBIC ACID) 1000 MG TABS 11/6/2020 at am Spouse/Significant Other Yes Yes   Sig: Take 2,000 mg by mouth daily       Facility-Administered Medications: None     Allergies   Allergies   Allergen Reactions     No Clinical Screening - See Comments Angioedema     Ate Escargot     Slug Extract  [Snail Extract] Angioedema       Social History   I have reviewed this patient's social history and updated it with pertinent information if needed. Candido Doe  reports that he has quit smoking. He does not have any smokeless tobacco history on file. He reports current alcohol use. He reports that he does not use drugs.    Family History   I have reviewed this patient's family history and updated it with pertinent information if needed.   Family History   Problem Relation Age of Onset     Heart Failure Mother      LUNG DISEASE Mother      Myocardial Infarction Father      Heart Failure Father        Review of Systems   The 10 point Review of Systems is negative other than noted in the HPI    Physical Exam   Temp: 97.5  F (36.4  C) Temp src: Oral BP: 122/72 Pulse: 57   Resp: 19 SpO2: 94 % O2 Device: Nasal cannula Oxygen Delivery: 3 LPM  Vital Signs with Ranges  Temp:  [95.9  F (35.5  C)-97.5  F (36.4  C)] 97.5  F (36.4  C)  Pulse:  [57-66] 57  Resp:  [10-23] 19  BP: (108-131)/(67-84) 122/72  SpO2:  [94 %-99 %] 94 %  213 lbs 2.96 oz    Constitutional: Awake, alert, cooperative, no apparent distress.  Eyes: Conjunctiva and pupils examined and normal.  HEENT: Moist mucous membranes, normal dentition.  Respiratory: Clear to auscultation bilaterally, no crackles or wheezing.  Cardiovascular: Regular rate and rhythm, normal S1 and S2, and no murmur noted.  GI: Soft, non-distended, non-tender, normal bowel sounds.  Lymph/Hematologic: No anterior cervical or supraclavicular adenopathy.  Skin: No rashes, no cyanosis, no edema.  Musculoskeletal: No joint swelling, erythema or tenderness. Right knee with incision, no surrounding erythema or drainage  Neurologic: Cranial nerves 2-12 intact, normal strength and sensation.  Psychiatric: Alert,  oriented to person, place and time, no obvious anxiety or depression.    Data   Data reviewed today:  I personally reviewed EKG which was sinus rhythm  Recent Labs   Lab 11/06/20  1243 11/06/20  1240   WBC  --  9.4   HGB 11.6* 11.3*   MCV  --  98   PLT  --  376   INR  --  1.75*    137   POTASSIUM 3.8 3.8   CHLORIDE  --  106   CO2  --  25   BUN  --  13   CR  --  1.00   ANIONGAP  --  6   BOBBY  --  8.6   GLC  --  187*   TROPI  --  0.034       No results found for this or any previous visit (from the past 24 hour(s)).

## 2020-11-06 NOTE — ED NOTES
Pt having runs of V-Tach. Dr. Post informed. Repeat 12-lead EKG being done. Labs redrawn due to wrong pt information entered when pt arrived.

## 2020-11-07 ENCOUNTER — APPOINTMENT (OUTPATIENT)
Dept: CARDIOLOGY | Facility: CLINIC | Age: 70
DRG: 225 | End: 2020-11-07
Attending: INTERNAL MEDICINE
Payer: MEDICARE

## 2020-11-07 LAB
ANION GAP SERPL CALCULATED.3IONS-SCNC: 5 MMOL/L (ref 3–14)
APTT PPP: 41 SEC (ref 22–37)
BUN SERPL-MCNC: 8 MG/DL (ref 7–30)
CALCIUM SERPL-MCNC: 8.9 MG/DL (ref 8.5–10.1)
CHLORIDE SERPL-SCNC: 107 MMOL/L (ref 94–109)
CO2 SERPL-SCNC: 26 MMOL/L (ref 20–32)
CREAT SERPL-MCNC: 0.83 MG/DL (ref 0.66–1.25)
ERYTHROCYTE [DISTWIDTH] IN BLOOD BY AUTOMATED COUNT: 13 % (ref 10–15)
GFR SERPL CREATININE-BSD FRML MDRD: 86 ML/MIN/{1.73_M2}
GLUCOSE SERPL-MCNC: 121 MG/DL (ref 70–99)
HCT VFR BLD AUTO: 36.7 % (ref 40–53)
HGB BLD-MCNC: 12.1 G/DL (ref 13.3–17.7)
MAGNESIUM SERPL-MCNC: 1.9 MG/DL (ref 1.6–2.3)
MCH RBC QN AUTO: 32.3 PG (ref 26.5–33)
MCHC RBC AUTO-ENTMCNC: 33 G/DL (ref 31.5–36.5)
MCV RBC AUTO: 98 FL (ref 78–100)
PLATELET # BLD AUTO: 382 10E9/L (ref 150–450)
POTASSIUM SERPL-SCNC: 3.8 MMOL/L (ref 3.4–5.3)
RBC # BLD AUTO: 3.75 10E12/L (ref 4.4–5.9)
SODIUM SERPL-SCNC: 138 MMOL/L (ref 133–144)
WBC # BLD AUTO: 8.2 10E9/L (ref 4–11)

## 2020-11-07 PROCEDURE — 250N000013 HC RX MED GY IP 250 OP 250 PS 637: Performed by: INTERNAL MEDICINE

## 2020-11-07 PROCEDURE — 93308 TTE F-UP OR LMTD: CPT

## 2020-11-07 PROCEDURE — 80048 BASIC METABOLIC PNL TOTAL CA: CPT | Performed by: HOSPITALIST

## 2020-11-07 PROCEDURE — 272N000001 HC OR GENERAL SUPPLY STERILE: Performed by: INTERNAL MEDICINE

## 2020-11-07 PROCEDURE — 99233 SBSQ HOSP IP/OBS HIGH 50: CPT | Mod: 25 | Performed by: INTERNAL MEDICINE

## 2020-11-07 PROCEDURE — 250N000011 HC RX IP 250 OP 636: Performed by: INTERNAL MEDICINE

## 2020-11-07 PROCEDURE — 93321 DOPPLER ECHO F-UP/LMTD STD: CPT | Mod: 26 | Performed by: INTERNAL MEDICINE

## 2020-11-07 PROCEDURE — 93325 DOPPLER ECHO COLOR FLOW MAPG: CPT | Mod: 26 | Performed by: INTERNAL MEDICINE

## 2020-11-07 PROCEDURE — 250N000011 HC RX IP 250 OP 636: Performed by: HOSPITALIST

## 2020-11-07 PROCEDURE — 83735 ASSAY OF MAGNESIUM: CPT | Performed by: HOSPITALIST

## 2020-11-07 PROCEDURE — B2111ZZ FLUOROSCOPY OF MULTIPLE CORONARY ARTERIES USING LOW OSMOLAR CONTRAST: ICD-10-PCS | Performed by: INTERNAL MEDICINE

## 2020-11-07 PROCEDURE — 36415 COLL VENOUS BLD VENIPUNCTURE: CPT | Performed by: HOSPITALIST

## 2020-11-07 PROCEDURE — 93458 L HRT ARTERY/VENTRICLE ANGIO: CPT | Performed by: INTERNAL MEDICINE

## 2020-11-07 PROCEDURE — C1769 GUIDE WIRE: HCPCS | Performed by: INTERNAL MEDICINE

## 2020-11-07 PROCEDURE — 85027 COMPLETE CBC AUTOMATED: CPT | Performed by: HOSPITALIST

## 2020-11-07 PROCEDURE — 99232 SBSQ HOSP IP/OBS MODERATE 35: CPT | Performed by: HOSPITALIST

## 2020-11-07 PROCEDURE — 210N000002 HC R&B HEART CARE

## 2020-11-07 PROCEDURE — 99153 MOD SED SAME PHYS/QHP EA: CPT | Performed by: INTERNAL MEDICINE

## 2020-11-07 PROCEDURE — C1894 INTRO/SHEATH, NON-LASER: HCPCS | Performed by: INTERNAL MEDICINE

## 2020-11-07 PROCEDURE — 258N000003 HC RX IP 258 OP 636: Performed by: INTERNAL MEDICINE

## 2020-11-07 PROCEDURE — 93308 TTE F-UP OR LMTD: CPT | Mod: 26 | Performed by: INTERNAL MEDICINE

## 2020-11-07 PROCEDURE — 85730 THROMBOPLASTIN TIME PARTIAL: CPT | Performed by: HOSPITALIST

## 2020-11-07 PROCEDURE — 258N000003 HC RX IP 258 OP 636: Performed by: HOSPITALIST

## 2020-11-07 PROCEDURE — 99152 MOD SED SAME PHYS/QHP 5/>YRS: CPT | Performed by: INTERNAL MEDICINE

## 2020-11-07 PROCEDURE — 250N000013 HC RX MED GY IP 250 OP 250 PS 637: Performed by: HOSPITALIST

## 2020-11-07 PROCEDURE — 250N000009 HC RX 250: Performed by: INTERNAL MEDICINE

## 2020-11-07 RX ORDER — AMIODARONE HYDROCHLORIDE 200 MG/1
400 TABLET ORAL DAILY
Status: DISCONTINUED | OUTPATIENT
Start: 2020-11-07 | End: 2020-11-09 | Stop reason: HOSPADM

## 2020-11-07 RX ORDER — SODIUM CHLORIDE 9 MG/ML
INJECTION, SOLUTION INTRAVENOUS CONTINUOUS
Status: DISCONTINUED | OUTPATIENT
Start: 2020-11-07 | End: 2020-11-09 | Stop reason: HOSPADM

## 2020-11-07 RX ORDER — POTASSIUM CHLORIDE 1500 MG/1
20 TABLET, EXTENDED RELEASE ORAL ONCE
Status: COMPLETED | OUTPATIENT
Start: 2020-11-07 | End: 2020-11-07

## 2020-11-07 RX ORDER — POTASSIUM CHLORIDE 1500 MG/1
20 TABLET, EXTENDED RELEASE ORAL
Status: COMPLETED | OUTPATIENT
Start: 2020-11-07 | End: 2020-11-07

## 2020-11-07 RX ORDER — SODIUM CHLORIDE 9 MG/ML
INJECTION, SOLUTION INTRAVENOUS CONTINUOUS
Status: DISCONTINUED | OUTPATIENT
Start: 2020-11-07 | End: 2020-11-07 | Stop reason: HOSPADM

## 2020-11-07 RX ORDER — ATROPINE SULFATE 0.1 MG/ML
0.5 INJECTION INTRAVENOUS
Status: ACTIVE | OUTPATIENT
Start: 2020-11-07 | End: 2020-11-07

## 2020-11-07 RX ORDER — IOPAMIDOL 755 MG/ML
INJECTION, SOLUTION INTRAVASCULAR
Status: DISCONTINUED | OUTPATIENT
Start: 2020-11-07 | End: 2020-11-07 | Stop reason: HOSPADM

## 2020-11-07 RX ORDER — LORAZEPAM 0.5 MG/1
0.5 TABLET ORAL
Status: DISCONTINUED | OUTPATIENT
Start: 2020-11-07 | End: 2020-11-07 | Stop reason: HOSPADM

## 2020-11-07 RX ORDER — FENTANYL CITRATE 50 UG/ML
INJECTION, SOLUTION INTRAMUSCULAR; INTRAVENOUS
Status: DISCONTINUED | OUTPATIENT
Start: 2020-11-07 | End: 2020-11-07 | Stop reason: HOSPADM

## 2020-11-07 RX ORDER — FLUMAZENIL 0.1 MG/ML
0.2 INJECTION, SOLUTION INTRAVENOUS
Status: ACTIVE | OUTPATIENT
Start: 2020-11-07 | End: 2020-11-07

## 2020-11-07 RX ORDER — ACETAMINOPHEN 325 MG/1
650 TABLET ORAL EVERY 4 HOURS PRN
Status: DISCONTINUED | OUTPATIENT
Start: 2020-11-07 | End: 2020-11-09 | Stop reason: HOSPADM

## 2020-11-07 RX ORDER — VERAPAMIL HYDROCHLORIDE 2.5 MG/ML
INJECTION, SOLUTION INTRAVENOUS
Status: DISCONTINUED | OUTPATIENT
Start: 2020-11-07 | End: 2020-11-07 | Stop reason: HOSPADM

## 2020-11-07 RX ORDER — LIDOCAINE 40 MG/G
CREAM TOPICAL
Status: CANCELLED | OUTPATIENT
Start: 2020-11-07

## 2020-11-07 RX ORDER — FENTANYL CITRATE 50 UG/ML
25-50 INJECTION, SOLUTION INTRAMUSCULAR; INTRAVENOUS
Status: ACTIVE | OUTPATIENT
Start: 2020-11-07 | End: 2020-11-07

## 2020-11-07 RX ORDER — LORAZEPAM 2 MG/ML
0.5 INJECTION INTRAMUSCULAR
Status: DISCONTINUED | OUTPATIENT
Start: 2020-11-07 | End: 2020-11-07 | Stop reason: HOSPADM

## 2020-11-07 RX ORDER — NALOXONE HYDROCHLORIDE 0.4 MG/ML
.2-.4 INJECTION, SOLUTION INTRAMUSCULAR; INTRAVENOUS; SUBCUTANEOUS
Status: DISCONTINUED | OUTPATIENT
Start: 2020-11-07 | End: 2020-11-07

## 2020-11-07 RX ORDER — NITROGLYCERIN 5 MG/ML
VIAL (ML) INTRAVENOUS
Status: DISCONTINUED | OUTPATIENT
Start: 2020-11-07 | End: 2020-11-07 | Stop reason: HOSPADM

## 2020-11-07 RX ORDER — LIDOCAINE 40 MG/G
CREAM TOPICAL
Status: DISCONTINUED | OUTPATIENT
Start: 2020-11-07 | End: 2020-11-07

## 2020-11-07 RX ADMIN — CARVEDILOL 25 MG: 25 TABLET, FILM COATED ORAL at 17:35

## 2020-11-07 RX ADMIN — POTASSIUM CHLORIDE 20 MEQ: 1500 TABLET, EXTENDED RELEASE ORAL at 08:48

## 2020-11-07 RX ADMIN — LOSARTAN POTASSIUM 50 MG: 50 TABLET, FILM COATED ORAL at 10:02

## 2020-11-07 RX ADMIN — CARVEDILOL 25 MG: 25 TABLET, FILM COATED ORAL at 10:02

## 2020-11-07 RX ADMIN — ASPIRIN 325 MG: 325 TABLET, COATED ORAL at 08:33

## 2020-11-07 RX ADMIN — CARBOXYMETHYLCELLULOSE SODIUM 2 DROP: 5 SOLUTION/ DROPS OPHTHALMIC at 10:05

## 2020-11-07 RX ADMIN — LATANOPROST 1 DROP: 50 SOLUTION/ DROPS OPHTHALMIC at 20:34

## 2020-11-07 RX ADMIN — SODIUM CHLORIDE: 9 INJECTION, SOLUTION INTRAVENOUS at 08:40

## 2020-11-07 RX ADMIN — CYANOCOBALAMIN TAB 1000 MCG 2000 MCG: 1000 TAB at 10:02

## 2020-11-07 RX ADMIN — POTASSIUM CHLORIDE 20 MEQ: 1500 TABLET, EXTENDED RELEASE ORAL at 15:00

## 2020-11-07 RX ADMIN — AMIODARONE HYDROCHLORIDE 0.5 MG/MIN: 50 INJECTION, SOLUTION INTRAVENOUS at 07:13

## 2020-11-07 RX ADMIN — AMIODARONE HYDROCHLORIDE 400 MG: 200 TABLET ORAL at 11:43

## 2020-11-07 RX ADMIN — OMEPRAZOLE 40 MG: 20 CAPSULE, DELAYED RELEASE ORAL at 10:01

## 2020-11-07 RX ADMIN — ATORVASTATIN CALCIUM 80 MG: 80 TABLET, FILM COATED ORAL at 20:33

## 2020-11-07 ASSESSMENT — MIFFLIN-ST. JEOR: SCORE: 1657.11

## 2020-11-07 NOTE — PLAN OF CARE
"PT: Cx, per chart review and discussion with RN, will plan to hold until Monday. Also troponin's elevated, per RN due to \"demand\" . Pt scheduled for ICD placement tomorrow. Appears safest to wait for this( hold on activity with PT) and re-assess pt following.   "

## 2020-11-07 NOTE — PROGRESS NOTES
"Cardiology Progress Note   Date of service: 20       Assessment and Plan:     1. Chest discomfort and ventricular tachycardia requiring defibrillation    Angiogram without significant obstructive disease.  Small marginal branch old occlusion, too small for intervention.    Check echo    Continue amiodarone, change to oral 400 mg daily for ventricular arrhythmia dosing      2. Cardiomyopathy on LV gram    Check echocardiogram.  Ejection fraction was relatively normal 3 weeks ago on outside echo    Patient had a significant amount of pain and stress from his knee surgery in the past few weeks, possibly stress cardiomyopathy versus recurrent atrial fibrillation and tachycardia mediated cardiomyopathy.      3. History of coronary artery disease    Stable    Discussed with patient that a LifeVest or ICD would both be reasonable.  Would continue the amiodarone.  Unknown if the cardiomyopathy is stress-induced or if there was an unknown recurrence of his atrial arrhythmia with aberrancy after his knee surgery.  Could consider 30-day monitor if he does not get a defibrillator or LifeVest.                 Interval History:   Cardiac catheterization reviewed and discussed with Dr. Cardenas.  Discussed options for follow-up and treatment with the patient.              Review of Systems:   As per subjective, otherwise 5 systems reviewed and negative.           Physical Exam:     Vitals were reviewed  Blood pressure 131/76, pulse 57, temperature 97.8  F (36.6  C), temperature source Oral, resp. rate 12, height 1.676 m (5' 6\"), weight 95.9 kg (211 lb 8 oz), SpO2 97 %.  Temperatures:  Current - Temp: 97.8  F (36.6  C); Max - Temp  Av.1  F (36.2  C)  Min: 95.9  F (35.5  C)  Max: 97.8  F (36.6  C)  Respiration range: Resp  Avg: 15.7  Min: 8  Max: 23  Pulse range: Pulse  Av.2  Min: 51  Max: 66  Blood pressure range: Systolic (24hrs), Av , Min:108 , Max:149   ; Diastolic (24hrs), Av, Min:67, Max:96    Pulse " oximetry range: SpO2  Av.9 %  Min: 93 %  Max: 99 %    Intake/Output Summary (Last 24 hours) at 2020 1057  Last data filed at 2020 1045  Gross per 24 hour   Intake 2019.5 ml   Output 1450 ml   Net 569.5 ml       Constitutional:   awake, alert, cooperative, no apparent distress, and appears stated age     Eyes:   Lids and lashes normal, pupils equal, round and reactive to light, extra ocular muscles intact, sclera clear, conjunctiva normal     Neck:   supple, symmetrical, trachea midline, no JVD     Back:   symmetric     Lungs:   Symmetric     Abdomen:   benign     Musculoskeletal:   motor strength is 5 out of 5 all extremities bilaterally     Neurologic:   Grossly nonfocal     Skin:   normal skin color, texture, turgor                Medications:     Current Facility-Administered Medications Ordered in Epic   Medication Dose Route Frequency Last Rate Last Dose     0.9% sodium chloride BOLUS  250 mL Intravenous Once PRN         acetaminophen (TYLENOL) Suppository 650 mg  650 mg Rectal Q4H PRN         acetaminophen (TYLENOL) tablet 650 mg  650 mg Oral Q4H PRN         amiodarone (PACERONE) tablet 400 mg  400 mg Oral Daily         [Held by provider] amLODIPine (NORVASC) tablet 5 mg  5 mg Oral Daily         aspirin EC tablet 81 mg  81 mg Oral Daily         atorvastatin (LIPITOR) tablet 80 mg  80 mg Oral QPM   80 mg at 20     atropine injection 0.5 mg  0.5 mg Intravenous Once PRN         carboxymethylcellulose PF (REFRESH PLUS) 0.5 % ophthalmic solution 2 drop  2 drop Both Eyes Daily   2 drop at 20 1005     carvedilol (COREG) tablet 25 mg  25 mg Oral BID w/meals   25 mg at 20 1002     cyanocobalamin (VITAMIN B-12) tablet 2,000 mcg  2,000 mcg Oral Daily   2,000 mcg at 20 1002     fentaNYL (PF) (SUBLIMAZE) injection 25-50 mcg  25-50 mcg Intravenous Q15 Min PRN         flumazenil (ROMAZICON) injection 0.2 mg  0.2 mg Intravenous q1 min prn         HOLD:  Metformin and metformin  containing medications if patient received IV contrast with acute kidney injury or severe chronic kidney disease (stage IV or stage V; i.e., eGFR less than 30)   Does not apply HOLD         ipratropium (ATROVENT) 0.06 % spray 2 spray  2 spray Both Nostrils TID PRN         latanoprost (XALATAN) 0.005 % ophthalmic solution 1 drop  1 drop Both Eyes At Bedtime   1 drop at 11/06/20 2056     lidocaine (LMX4) cream   Topical Q1H PRN         lidocaine 1 % 0.1-1 mL  0.1-1 mL Other Q1H PRN         LORazepam (ATIVAN) tablet 0.5 mg  0.5 mg Oral At Bedtime PRN         losartan (COZAAR) tablet 50 mg  50 mg Oral Daily   50 mg at 11/07/20 1002     magnesium oxide (MAG-OX) tablet 400 mg  400 mg Oral QPM         melatonin tablet 1 mg  1 mg Oral At Bedtime PRN         midazolam (VERSED) injection 0.5-1 mg  0.5-1 mg Intravenous Q5 Min PRN         mirtazapine (REMERON) tablet TABS 7.5 mg  7.5 mg Oral At Bedtime         naloxone (NARCAN) injection 0.1-0.4 mg  0.1-0.4 mg Intravenous Q2 Min PRN         nitroGLYcerin (NITROSTAT) sublingual tablet 0.4 mg  0.4 mg Sublingual Q5 Min PRN         omeprazole (priLOSEC) CR capsule 40 mg  40 mg Oral QAM   40 mg at 11/07/20 1001     ondansetron (ZOFRAN-ODT) ODT tab 4 mg  4 mg Oral Q6H PRN        Or     ondansetron (ZOFRAN) injection 4 mg  4 mg Intravenous Q6H PRN         oxyCODONE (ROXICODONE) tablet 5 mg  5 mg Oral TID PRN         Patient is already receiving anticoagulation with heparin, enoxaparin (LOVENOX), warfarin (COUMADIN)  or other anticoagulant medication   Does not apply Continuous PRN         polyethylene glycol (MIRALAX) Packet 17 g  17 g Oral Daily PRN         senna-docusate (SENOKOT-S/PERICOLACE) 8.6-50 MG per tablet 1 tablet  1 tablet Oral BID PRN        Or     senna-docusate (SENOKOT-S/PERICOLACE) 8.6-50 MG per tablet 2 tablet  2 tablet Oral BID PRN         sodium chloride (PF) 0.9% PF flush 3 mL  3 mL Intracatheter q1 min prn         sodium chloride (PF) 0.9% PF flush 3 mL  3 mL  Intracatheter Q8H         sodium chloride 0.9% infusion   Intravenous Continuous 75 mL/hr at 11/07/20 1006       No current [facility]-ordered outpatient medications on file.                Data:   All laboratory data reviewed  Results for orders placed or performed during the hospital encounter of 11/06/20 (from the past 24 hour(s))   EKG 12-lead, tracing only   Result Value Ref Range    Interpretation ECG Click View Image link to view waveform and result    CBC with platelets differential   Result Value Ref Range    WBC 9.4 4.0 - 11.0 10e9/L    RBC Count 3.56 (L) 4.4 - 5.9 10e12/L    Hemoglobin 11.3 (L) 13.3 - 17.7 g/dL    Hematocrit 34.9 (L) 40.0 - 53.0 %    MCV 98 78 - 100 fl    MCH 31.7 26.5 - 33.0 pg    MCHC 32.4 31.5 - 36.5 g/dL    RDW 12.9 10.0 - 15.0 %    Platelet Count 376 150 - 450 10e9/L    Diff Method Automated Method     % Neutrophils 74.8 %    % Lymphocytes 13.4 %    % Monocytes 9.9 %    % Eosinophils 1.4 %    % Basophils 0.2 %    % Immature Granulocytes 0.3 %    Nucleated RBCs 0 0 /100    Absolute Neutrophil 7.0 1.6 - 8.3 10e9/L    Absolute Lymphocytes 1.3 0.8 - 5.3 10e9/L    Absolute Monocytes 0.9 0.0 - 1.3 10e9/L    Absolute Eosinophils 0.1 0.0 - 0.7 10e9/L    Absolute Basophils 0.0 0.0 - 0.2 10e9/L    Abs Immature Granulocytes 0.0 0 - 0.4 10e9/L    Absolute Nucleated RBC 0.0    INR   Result Value Ref Range    INR 1.75 (H) 0.86 - 1.14   Partial thromboplastin time   Result Value Ref Range    PTT 46 (H) 22 - 37 sec   Basic metabolic panel   Result Value Ref Range    Sodium 137 133 - 144 mmol/L    Potassium 3.8 3.4 - 5.3 mmol/L    Chloride 106 94 - 109 mmol/L    Carbon Dioxide 25 20 - 32 mmol/L    Anion Gap 6 3 - 14 mmol/L    Glucose 187 (H) 70 - 99 mg/dL    Urea Nitrogen 13 7 - 30 mg/dL    Creatinine 1.00 0.66 - 1.25 mg/dL    GFR Estimate 76 >60 mL/min/[1.73_m2]    GFR Estimate If Black 88 >60 mL/min/[1.73_m2]    Calcium 8.6 8.5 - 10.1 mg/dL   Troponin I   Result Value Ref Range    Troponin I ES 0.034  0.000 - 0.045 ug/L   Magnesium   Result Value Ref Range    Magnesium 1.9 1.6 - 2.3 mg/dL   Lactic acid whole blood   Result Value Ref Range    Lactic Acid 1.9 0.7 - 2.0 mmol/L   ISTAT gases elec carlos POCT   Result Value Ref Range    Ph Venous 7.39 7.32 - 7.43 pH    PCO2 Venous 41 40 - 50 mm Hg    PO2 Venous 27 25 - 47 mm Hg    Bicarbonate Venous 25 21 - 28 mmol/L    O2 Sat Venous 49 %    Sodium 140 133 - 144 mmol/L    Potassium 3.8 3.4 - 5.3 mmol/L    Hemoglobin 11.6 (L) 13.3 - 17.7 g/dL    Hematocrit - POCT 34 (L) 40.0 - 53.0 %PCV   Asymptomatic COVID-19 Virus (Coronavirus) by PCR    Specimen: Nasopharyngeal   Result Value Ref Range    COVID-19 Virus PCR to [facility] - Source Nasopharyngeal     COVID-19 Virus PCR to [facility] - Result       Test received-See reflex to IDDL test SARS CoV2 (COVID-19) Virus RT-PCR   SARS-CoV-2 COVID-19 Virus (Coronavirus) RT-PCR Nasopharyngeal    Specimen: Nasopharyngeal   Result Value Ref Range    SARS-CoV-2 Virus Specimen Source Nasopharyngeal     SARS-CoV-2 PCR Result NEGATIVE     SARS-CoV-2 PCR Comment       Testing was performed using the BrightLine[employer] Xpress SARS-CoV-2 Assay on the Cepheid Gene-Xpert   Instrument Systems. Additional information about this Emergency Use Authorization (EUA)   assay can be found via the Lab Guide.     Troponin I   Result Value Ref Range    Troponin I ES 1.219 (HH) 0.000 - 0.045 ug/L   Partial thromboplastin time   Result Value Ref Range    PTT 44 (H) 22 - 37 sec   CBC with platelets   Result Value Ref Range    WBC 8.9 4.0 - 11.0 10e9/L    RBC Count 3.58 (L) 4.4 - 5.9 10e12/L    Hemoglobin 11.4 (L) 13.3 - 17.7 g/dL    Hematocrit 34.8 (L) 40.0 - 53.0 %    MCV 97 78 - 100 fl    MCH 31.8 26.5 - 33.0 pg    MCHC 32.8 31.5 - 36.5 g/dL    RDW 12.7 10.0 - 15.0 %    Platelet Count 363 150 - 450 10e9/L   Partial thromboplastin time   Result Value Ref Range    PTT 66 (H) 22 - 37 sec   Troponin I   Result Value Ref Range    Troponin I ES 1.329 (HH) 0.000 - 0.045 ug/L    Partial thromboplastin time   Result Value Ref Range    PTT 41 (H) 22 - 37 sec   Basic metabolic panel   Result Value Ref Range    Sodium 138 133 - 144 mmol/L    Potassium 3.8 3.4 - 5.3 mmol/L    Chloride 107 94 - 109 mmol/L    Carbon Dioxide 26 20 - 32 mmol/L    Anion Gap 5 3 - 14 mmol/L    Glucose 121 (H) 70 - 99 mg/dL    Urea Nitrogen 8 7 - 30 mg/dL    Creatinine 0.83 0.66 - 1.25 mg/dL    GFR Estimate 86 >60 mL/min/[1.73_m2]    GFR Estimate If Black 100 >60 mL/min/[1.73_m2]    Calcium 8.9 8.5 - 10.1 mg/dL   CBC with platelets   Result Value Ref Range    WBC 8.2 4.0 - 11.0 10e9/L    RBC Count 3.75 (L) 4.4 - 5.9 10e12/L    Hemoglobin 12.1 (L) 13.3 - 17.7 g/dL    Hematocrit 36.7 (L) 40.0 - 53.0 %    MCV 98 78 - 100 fl    MCH 32.3 26.5 - 33.0 pg    MCHC 33.0 31.5 - 36.5 g/dL    RDW 13.0 10.0 - 15.0 %    Platelet Count 382 150 - 450 10e9/L   Magnesium   Result Value Ref Range    Magnesium 1.9 1.6 - 2.3 mg/dL   Cardiac Catheterization    Narrative    1) Stable non-obstructive CAD with no acute lesions noted as cause for VT   presentation  2) Cardiomyopathy with inferior hypokinesis and EF estimated around   35-40%; will need formal echo. Last echo from [facility] 10/2020 reported EF   50-55%       All laboratory data reviewed  No results found for: CHOL  No results found for: HDL  No results found for: LDL  No results found for: TRIG  No results found for: CHOLHDLRATIO  No results found for: TSH  Last Basic Metabolic Panel:  Lab Results   Component Value Date     11/07/2020      Lab Results   Component Value Date    POTASSIUM 3.8 11/07/2020     Lab Results   Component Value Date    CHLORIDE 107 11/07/2020     Lab Results   Component Value Date    BOBBY 8.9 11/07/2020     Lab Results   Component Value Date    CO2 26 11/07/2020     Lab Results   Component Value Date    BUN 8 11/07/2020     Lab Results   Component Value Date    CR 0.83 11/07/2020     Lab Results   Component Value Date     11/07/2020     Lab  Results   Component Value Date    WBC 8.2 11/07/2020     Lab Results   Component Value Date    RBC 3.75 11/07/2020     Lab Results   Component Value Date    HGB 12.1 11/07/2020     Lab Results   Component Value Date    HCT 36.7 11/07/2020     Lab Results   Component Value Date    MCV 98 11/07/2020     Lab Results   Component Value Date    MCH 32.3 11/07/2020     Lab Results   Component Value Date    MCHC 33.0 11/07/2020     Lab Results   Component Value Date    RDW 13.0 11/07/2020     Lab Results   Component Value Date     11/07/2020

## 2020-11-07 NOTE — PROGRESS NOTES
"DATE:  11/6/2020   TIME OF RECEIPT FROM LAB:  1903  LAB TEST:  Troponin  LAB VALUE:  1.219  RESULTS GIVEN WITH READ-BACK TO (PROVIDER):  Dr. Arceo (text page to on-call)  \"FYI critical troponin of 1.219- on Heparin gtt denies CP (other than ache with cough and deep breath likely from being shocked x3 today)\"  TIME LAB VALUE REPORTED TO PROVIDER:   1906    "

## 2020-11-07 NOTE — PROGRESS NOTES
Xcoverage: paged by RM because trop 1.219; he was admitted earlier today with wide complex tachycardia, s/p cardioversionX3; he is on heparin drip; repeat troponin at midnight.    Giselle Arceo MD

## 2020-11-07 NOTE — PROGRESS NOTES
[facility]    Hospitalist Progress Note      Assessment & Plan   Candido Doe is a 70 year old male who was admitted on 11/6/2020 with chest pain, wide complex rhythm seen on EKG, defibrillated back into sinus rhythm with improvement in rhythm seen with initiation of amiodarone    Wide complex v tach s/p cardioversion x3  Cardiomyopathy  Reported 1 hr onset of chest tightness/heaviness, called PCP who recommended EMS transport to hospital. Patient was noted to be hypotensive on home BP cuff (SBP 70s per wife). EMS gave 12mg adenosine without conversion, then cardioverted x3, after first shock was pulseless v-fib then after 2 more shocks eventual return to sinus rhythm with occasional vtach pulses. Given amiodarone bolus, then started on gtt with stabilization of rhythm to sinus. Trop 0.034--1.219---1.329 (elevation seen secondary to defibrillation earlier in day). Patient reports resolution of chest tightness. Strips reviewed show wide complex v tach, then sinus with occasional wide complex bursts. EKG sinus.  *cardiac cath 11/7: no significant obstructive disease, small marginal branch old occlusion too small for intervention  *echocardiogram 11/7: EF 40-45%, LVSF mildly reduced. Severe hypokinesis in mid-apical segments of the lateral and inferolateral walls. Left atrium moderately dilated.  - Initially on amiodarone gtt, switched to 400mg once daily for ventricular arrhthymia dosing  - Cardiology consult appreciated, plan for ICD placement on 11/8  - cardiomyopathy could be stress induced.  - Keep K>4, Mag>2 (resumed PTA PO mag, given 20meq KCl repletion on 11/6 and 11/7)  - heparin gtt while holding xarelto  - prn nitroglycerin     Atrial fibrillation  Hypertension  Hx CAD, STEMI s/p PCI with ROGELIO to RCA 8/2014  Echo 10/12/20: EF 50-55%, inferior wall and posterior wall abnormal, enlarged left atrium. Mitral valve sclerotic, trace mitral regurg, ascending aorta dilated to 3.9cm,  aortic sinus 4cm.  PTA amlodipine 5mg daily, coreg 25mg BID, losartan 50mg daily, asa 81mg daily, xarelto 20mg daily. lipitor 80mg daily  - Holding xarelto  - Hold amlodipine given low BPs with initiation of amiodarone  - Continue PTA coreg, losartan, ASA, lipitor     Recent right knee replacement  ~2 weeks prior. Following with outpatient therapies, last seen on 11/5  - PT consult here     Anemia  Presume secondary to recent blood loss anemia with right knee replacement  - Hgb 11.6 on admit, improved to 12.1 AM of 11/7  - Trend CBC in AM     Obstructive sleep apnea   Has not been using his CPAP in the past 2 weeks, does not want it while here.     GERD  PTA omeprazole 40mg daily, continue while inpatient     Depression  PTA remeron at bedtime, continued     Asymptomatic COVID-19 screening  No symptoms. Screened as asymptomatic, urgent for procedure  - Low suspicion    DVT Prophylaxis: heparin gtt  Code Status: Full Code  Expected discharge: 48 hours, recommended to prior living arrangement once cardiac work-up completed    Bria Roche DO  Text Page (7am - 6pm)    Interval History   Patient seen and examined. No chest pain, shortness of breath, nausea, vomiting. Does report some pain with deep breaths, secondary to defibrillation attempts, but nothing that resembled his presenting symptoms. Amenable to ICD on 11/8.  Discussed care plan with Cardiology    -Data reviewed today: I reviewed all new labs and imaging results over the last 24 hours. I personally reviewed no images or EKG's today.    Physical Exam   Temp: 97.8  F (36.6  C) Temp src: Oral BP: 108/62 Pulse: 57   Resp: 24 SpO2: 93 % O2 Device: None (Room air) Oxygen Delivery: 3 LPM  Vitals:    11/06/20 1230 11/07/20 0647   Weight: 96.7 kg (213 lb 3 oz) 95.9 kg (211 lb 8 oz)     Vital Signs with Ranges  Temp:  [97.5  F (36.4  C)-97.8  F (36.6  C)] 97.8  F (36.6  C)  Pulse:  [51-66] 57  Resp:  [8-24] 24  BP: (108-149)/(62-96) 108/62  SpO2:  [93 %-99 %] 93  %  I/O last 3 completed shifts:  In: 1240 [P.O.:240; IV Piggyback:1000]  Out: 1450 [Urine:1450]    Constitutional: Awake, alert, cooperative, no apparent distress  Respiratory: Clear to auscultation bilaterally, no crackles or wheezing  Cardiovascular: bradycardic, regular rhythm, normal S1 and S2, and no murmur noted  GI: Normal bowel sounds, soft, non-distended, non-tender  Skin/Integumen: No rashes, no cyanosis, no edema  Other:     Medications     - MEDICATION INSTRUCTIONS -       sodium chloride 75 mL/hr at 11/07/20 1006       amiodarone  400 mg Oral Daily     [Held by provider] amLODIPine  5 mg Oral Daily     aspirin  81 mg Oral Daily     atorvastatin  80 mg Oral QPM     carboxymethylcellulose PF  2 drop Both Eyes Daily     carvedilol  25 mg Oral BID w/meals     cyanocobalamin  2,000 mcg Oral Daily     latanoprost  1 drop Both Eyes At Bedtime     losartan  50 mg Oral Daily     magnesium oxide  400 mg Oral QPM     mirtazapine  7.5 mg Oral At Bedtime     omeprazole  40 mg Oral QAM     potassium chloride  20 mEq Oral Once     sodium chloride (PF)  3 mL Intracatheter Q8H       Data   Recent Labs   Lab 11/07/20  0535 11/06/20  2243 11/06/20  1728 11/06/20  1243 11/06/20  1240   WBC 8.2  --  8.9  --  9.4   HGB 12.1*  --  11.4* 11.6* 11.3*   MCV 98  --  97  --  98     --  363  --  376   INR  --   --   --   --  1.75*     --   --  140 137   POTASSIUM 3.8  --   --  3.8 3.8   CHLORIDE 107  --   --   --  106   CO2 26  --   --   --  25   BUN 8  --   --   --  13   CR 0.83  --   --   --  1.00   ANIONGAP 5  --   --   --  6   BOBBY 8.9  --   --   --  8.6   *  --   --   --  187*   TROPI  --  1.329* 1.219*  --  0.034       Recent Results (from the past 24 hour(s))   Cardiac Catheterization    Narrative    1) Stable non-obstructive CAD with no acute lesions noted as cause for VT   presentation  2) Cardiomyopathy with inferior hypokinesis and EF estimated around   35-40%; will need formal echo. Last echo from  [facility] 10/2020 reported EF   50-55%   Echocardiogram Limited    Narrative    [id number]  [id number]  [id number]  [id number]^MINH^TOBIAS           [facility]  Echocardiography Laboratory  [address] [place] [address]        Name: KENYA MOSQUEDA  MRN: [id number]  : 1950  Study Date: 2020 11:14 AM  Age: 70 yrs  Gender: Male  Patient Location: [facility]  Reason For Study: VT  Ordering Physician: MINH WEBER  Referring Physician: MALICK VAUGHN  Performed By: Mona Huff     BSA: 2.0 m2  Height: 66 in  Weight: 211 lb  HR: 56  BP: 131/76 mmHg  _____________________________________________________________________________  __        Procedure  Limited Portable Echo Adult.  _____________________________________________________________________________  __        Interpretation Summary     1. Left ventricular systolic function is mildly reduced. The ejection fraction  is estimated at 40-45%. There is severe hypokinesis of the mid-apical segments  of the lateral and inferolateral walls.  2. The right ventricle is normal in size and function.  3. The left atrium is at least moderately dilated. Right atrial size is  normal.  4. No hemodynamically significant valvular disease.  No prior study.  _____________________________________________________________________________  __        Left Ventricle  The left ventricle is mildly dilated. There is mild eccentric left ventricular  hypertrophy. Left ventricular systolic function is mildly reduced. The visual  ejection fraction is estimated at 40-45%. There is severe hypokinesis of the  mid-apical segments of the lateral and inferolateral walls.     Right Ventricle  The right ventricle is normal in size and function.     Atria  The left atrium is moderately dilated. Right atrial size is normal.     Mitral Valve  The mitral valve leaflets appear thickened, but open well. There is mild (1+)  mitral regurgitation.        Tricuspid  Valve  Normal tricuspid valve. There is trace tricuspid regurgitation. Right  ventricular systolic pressure could not be approximated due to inadequate  tricuspid regurgitation.     Aortic Valve  The aortic valve is not well visualized.     Pulmonic Valve  The pulmonic valve is not well visualized.     Vessels  Normal size aorta. IVC diameter <2.1 cm collapsing >50% with sniff suggests a  normal RA pressure of 3 mmHg.     _____________________________________________________________________________  __  MMode/2D Measurements & Calculations  IVSd: 1.2 cm  LVIDd: 6.1 cm  LVIDs: 4.4 cm  LVPWd: 0.91 cm  FS: 27.4 %  LV mass(C)d: 263.9 grams  LV mass(C)dI: 129.0 grams/m2  RWT: 0.30                    _____________________________________________________________________________  __        Report approved by: Patricia Tejada 11/07/2020 12:01 PM

## 2020-11-07 NOTE — PLAN OF CARE
Pt A/O, up using urinal at bedside SBA. VSS on RA. Tele: SB, HR 50's. Pt endorses chest soreness from defib x3 when he coughs or takes a deep breath. Denies CP like previous or SOB. Amiodarone infusing @0.5mg/min and Heparin gtt @1150u/hr. Re-check PTT at 2245. Okay per Dr. Lofton to continue Amiodarone gtt with HR >50. Plan for echo and angiogram.

## 2020-11-07 NOTE — PRE-PROCEDURE
GENERAL PRE-PROCEDURE:   Procedure:  Coronary angiogram  Date/Time:  11/7/2020 9:01 AM    Verbal consent obtained?: Yes    Written consent obtained?: Yes    Risks and benefits: Risks, benefits and alternatives were discussed    Consent given by:  Patient  Patient states understanding of procedure being performed: Yes    Patient's understanding of procedure matches consent: Yes    Procedure consent matches procedure scheduled: Yes    Appropriately NPO:  Yes  ASA Class:  Class 2- mild systemic disease, no acute problems, no functional limitations  Mallampati  :  Grade 2- soft palate, base of uvula, tonsillar pillars, and portion of posterior pharyngeal wall visible  Lungs:  Lungs clear with good breath sounds bilaterally  Heart:  Normal heart sounds and rate  History & Physical reviewed:  History and physical reviewed and no updates needed  Statement of review:  I have reviewed the lab findings, diagnostic data, medications, and the plan for sedation

## 2020-11-08 ENCOUNTER — SURGERY (OUTPATIENT)
Age: 70
End: 2020-11-08
Payer: MEDICARE

## 2020-11-08 LAB
ANION GAP SERPL CALCULATED.3IONS-SCNC: 3 MMOL/L (ref 3–14)
BUN SERPL-MCNC: 8 MG/DL (ref 7–30)
CALCIUM SERPL-MCNC: 8.9 MG/DL (ref 8.5–10.1)
CHLORIDE SERPL-SCNC: 110 MMOL/L (ref 94–109)
CO2 SERPL-SCNC: 28 MMOL/L (ref 20–32)
CREAT SERPL-MCNC: 0.7 MG/DL (ref 0.66–1.25)
ERYTHROCYTE [DISTWIDTH] IN BLOOD BY AUTOMATED COUNT: 13 % (ref 10–15)
GFR SERPL CREATININE-BSD FRML MDRD: >90 ML/MIN/{1.73_M2}
GLUCOSE SERPL-MCNC: 116 MG/DL (ref 70–99)
HCT VFR BLD AUTO: 36.3 % (ref 40–53)
HGB BLD-MCNC: 12 G/DL (ref 13.3–17.7)
INR PPP: 1.27 (ref 0.86–1.14)
MAGNESIUM SERPL-MCNC: 1.7 MG/DL (ref 1.6–2.3)
MCH RBC QN AUTO: 32.2 PG (ref 26.5–33)
MCHC RBC AUTO-ENTMCNC: 33.1 G/DL (ref 31.5–36.5)
MCV RBC AUTO: 97 FL (ref 78–100)
PLATELET # BLD AUTO: 313 10E9/L (ref 150–450)
POTASSIUM SERPL-SCNC: 3.7 MMOL/L (ref 3.4–5.3)
RBC # BLD AUTO: 3.73 10E12/L (ref 4.4–5.9)
SODIUM SERPL-SCNC: 141 MMOL/L (ref 133–144)
WBC # BLD AUTO: 7 10E9/L (ref 4–11)

## 2020-11-08 PROCEDURE — C1894 INTRO/SHEATH, NON-LASER: HCPCS | Performed by: INTERNAL MEDICINE

## 2020-11-08 PROCEDURE — 250N000011 HC RX IP 250 OP 636: Performed by: INTERNAL MEDICINE

## 2020-11-08 PROCEDURE — 85027 COMPLETE CBC AUTOMATED: CPT | Performed by: HOSPITALIST

## 2020-11-08 PROCEDURE — 80048 BASIC METABOLIC PNL TOTAL CA: CPT | Performed by: HOSPITALIST

## 2020-11-08 PROCEDURE — 250N000009 HC RX 250: Performed by: INTERNAL MEDICINE

## 2020-11-08 PROCEDURE — C1721 AICD, DUAL CHAMBER: HCPCS | Performed by: INTERNAL MEDICINE

## 2020-11-08 PROCEDURE — 85610 PROTHROMBIN TIME: CPT | Performed by: HOSPITALIST

## 2020-11-08 PROCEDURE — 250N000011 HC RX IP 250 OP 636: Performed by: HOSPITALIST

## 2020-11-08 PROCEDURE — 33249 INSJ/RPLCMT DEFIB W/LEAD(S): CPT | Performed by: INTERNAL MEDICINE

## 2020-11-08 PROCEDURE — 02H63KZ INSERTION OF DEFIBRILLATOR LEAD INTO RIGHT ATRIUM, PERCUTANEOUS APPROACH: ICD-10-PCS | Performed by: INTERNAL MEDICINE

## 2020-11-08 PROCEDURE — 99152 MOD SED SAME PHYS/QHP 5/>YRS: CPT | Performed by: INTERNAL MEDICINE

## 2020-11-08 PROCEDURE — 0JH608Z INSERTION OF DEFIBRILLATOR GENERATOR INTO CHEST SUBCUTANEOUS TISSUE AND FASCIA, OPEN APPROACH: ICD-10-PCS | Performed by: INTERNAL MEDICINE

## 2020-11-08 PROCEDURE — 250N000013 HC RX MED GY IP 250 OP 250 PS 637: Performed by: INTERNAL MEDICINE

## 2020-11-08 PROCEDURE — 99153 MOD SED SAME PHYS/QHP EA: CPT | Performed by: INTERNAL MEDICINE

## 2020-11-08 PROCEDURE — 99152 MOD SED SAME PHYS/QHP 5/>YRS: CPT | Mod: 59 | Performed by: INTERNAL MEDICINE

## 2020-11-08 PROCEDURE — C1777 LEAD, AICD, ENDO SINGLE COIL: HCPCS | Performed by: INTERNAL MEDICINE

## 2020-11-08 PROCEDURE — 210N000002 HC R&B HEART CARE

## 2020-11-08 PROCEDURE — 83735 ASSAY OF MAGNESIUM: CPT | Performed by: HOSPITALIST

## 2020-11-08 PROCEDURE — 02HK3KZ INSERTION OF DEFIBRILLATOR LEAD INTO RIGHT VENTRICLE, PERCUTANEOUS APPROACH: ICD-10-PCS | Performed by: INTERNAL MEDICINE

## 2020-11-08 PROCEDURE — 99232 SBSQ HOSP IP/OBS MODERATE 35: CPT | Performed by: HOSPITALIST

## 2020-11-08 PROCEDURE — 99024 POSTOP FOLLOW-UP VISIT: CPT | Performed by: INTERNAL MEDICINE

## 2020-11-08 PROCEDURE — 250N000013 HC RX MED GY IP 250 OP 250 PS 637: Performed by: HOSPITALIST

## 2020-11-08 PROCEDURE — 258N000002 HC RX IP 258 OP 250: Performed by: INTERNAL MEDICINE

## 2020-11-08 PROCEDURE — 99233 SBSQ HOSP IP/OBS HIGH 50: CPT | Mod: 25 | Performed by: INTERNAL MEDICINE

## 2020-11-08 PROCEDURE — C1898 LEAD, PMKR, OTHER THAN TRANS: HCPCS | Performed by: INTERNAL MEDICINE

## 2020-11-08 PROCEDURE — 36415 COLL VENOUS BLD VENIPUNCTURE: CPT | Performed by: HOSPITALIST

## 2020-11-08 PROCEDURE — 272N000001 HC OR GENERAL SUPPLY STERILE: Performed by: INTERNAL MEDICINE

## 2020-11-08 DEVICE — LEAD INGEVITY+ AF IS1 7840 4CM: Type: IMPLANTABLE DEVICE | Status: FUNCTIONAL

## 2020-11-08 DEVICE — ICD RESONATE EL DR DF4 D433: Type: IMPLANTABLE DEVICE | Status: FUNCTIONAL

## 2020-11-08 RX ORDER — CEFAZOLIN SODIUM 2 G/100ML
2 INJECTION, SOLUTION INTRAVENOUS EVERY 8 HOURS
Status: COMPLETED | OUTPATIENT
Start: 2020-11-08 | End: 2020-11-09

## 2020-11-08 RX ORDER — CEFAZOLIN SODIUM 2 G/100ML
2 INJECTION, SOLUTION INTRAVENOUS EVERY 8 HOURS
Status: DISCONTINUED | OUTPATIENT
Start: 2020-11-08 | End: 2020-11-08

## 2020-11-08 RX ORDER — MAGNESIUM OXIDE 400 MG/1
400 TABLET ORAL ONCE
Status: COMPLETED | OUTPATIENT
Start: 2020-11-08 | End: 2020-11-08

## 2020-11-08 RX ORDER — BUPIVACAINE HYDROCHLORIDE 2.5 MG/ML
INJECTION, SOLUTION EPIDURAL; INFILTRATION; INTRACAUDAL
Status: DISCONTINUED | OUTPATIENT
Start: 2020-11-08 | End: 2020-11-08 | Stop reason: HOSPADM

## 2020-11-08 RX ORDER — OXYCODONE AND ACETAMINOPHEN 5; 325 MG/1; MG/1
1 TABLET ORAL EVERY 4 HOURS PRN
Status: DISCONTINUED | OUTPATIENT
Start: 2020-11-08 | End: 2020-11-09 | Stop reason: HOSPADM

## 2020-11-08 RX ORDER — AMIODARONE HYDROCHLORIDE 400 MG/1
400 TABLET ORAL DAILY
Qty: 30 TABLET | Refills: 0 | Status: SHIPPED | OUTPATIENT
Start: 2020-11-09

## 2020-11-08 RX ORDER — FENTANYL CITRATE 50 UG/ML
INJECTION, SOLUTION INTRAMUSCULAR; INTRAVENOUS
Status: DISCONTINUED | OUTPATIENT
Start: 2020-11-08 | End: 2020-11-08 | Stop reason: HOSPADM

## 2020-11-08 RX ORDER — AMLODIPINE BESYLATE 5 MG/1
5 TABLET ORAL DAILY
Status: DISCONTINUED | OUTPATIENT
Start: 2020-11-08 | End: 2020-11-09 | Stop reason: HOSPADM

## 2020-11-08 RX ORDER — HEPARIN SODIUM 200 [USP'U]/100ML
100-600 INJECTION, SOLUTION INTRAVENOUS CONTINUOUS PRN
Status: DISCONTINUED | OUTPATIENT
Start: 2020-11-08 | End: 2020-11-08 | Stop reason: HOSPADM

## 2020-11-08 RX ORDER — HEPARIN SODIUM 200 [USP'U]/100ML
100-500 INJECTION, SOLUTION INTRAVENOUS CONTINUOUS PRN
Status: DISCONTINUED | OUTPATIENT
Start: 2020-11-08 | End: 2020-11-08 | Stop reason: HOSPADM

## 2020-11-08 RX ORDER — POTASSIUM CHLORIDE 1500 MG/1
40 TABLET, EXTENDED RELEASE ORAL ONCE
Status: COMPLETED | OUTPATIENT
Start: 2020-11-08 | End: 2020-11-08

## 2020-11-08 RX ORDER — DOBUTAMINE HYDROCHLORIDE 200 MG/100ML
5-40 INJECTION INTRAVENOUS CONTINUOUS PRN
Status: DISCONTINUED | OUTPATIENT
Start: 2020-11-08 | End: 2020-11-08 | Stop reason: HOSPADM

## 2020-11-08 RX ORDER — CEFAZOLIN SODIUM 1 G/3ML
1 INJECTION, POWDER, FOR SOLUTION INTRAMUSCULAR; INTRAVENOUS
Status: DISCONTINUED | OUTPATIENT
Start: 2020-11-08 | End: 2020-11-08 | Stop reason: HOSPADM

## 2020-11-08 RX ORDER — SODIUM CHLORIDE 450 MG/100ML
INJECTION, SOLUTION INTRAVENOUS CONTINUOUS
Status: DISCONTINUED | OUTPATIENT
Start: 2020-11-08 | End: 2020-11-08 | Stop reason: HOSPADM

## 2020-11-08 RX ORDER — CEFAZOLIN SODIUM 2 G/100ML
2 INJECTION, SOLUTION INTRAVENOUS
Status: COMPLETED | OUTPATIENT
Start: 2020-11-08 | End: 2020-11-08

## 2020-11-08 RX ADMIN — CARBOXYMETHYLCELLULOSE SODIUM 2 DROP: 5 SOLUTION/ DROPS OPHTHALMIC at 08:15

## 2020-11-08 RX ADMIN — ACETAMINOPHEN 650 MG: 325 TABLET, FILM COATED ORAL at 15:07

## 2020-11-08 RX ADMIN — CARVEDILOL 25 MG: 25 TABLET, FILM COATED ORAL at 17:19

## 2020-11-08 RX ADMIN — OXYCODONE HYDROCHLORIDE AND ACETAMINOPHEN 1 TABLET: 5; 325 TABLET ORAL at 15:20

## 2020-11-08 RX ADMIN — LIDOCAINE HYDROCHLORIDE 15 ML: 10 INJECTION, SOLUTION EPIDURAL; INFILTRATION; INTRACAUDAL; PERINEURAL at 09:32

## 2020-11-08 RX ADMIN — CEFAZOLIN SODIUM 2 G: 2 INJECTION, SOLUTION INTRAVENOUS at 16:10

## 2020-11-08 RX ADMIN — OXYCODONE HYDROCHLORIDE 5 MG: 5 TABLET ORAL at 10:56

## 2020-11-08 RX ADMIN — FENTANYL CITRATE 50 MCG: 50 INJECTION, SOLUTION INTRAMUSCULAR; INTRAVENOUS at 09:22

## 2020-11-08 RX ADMIN — MIDAZOLAM 0.5 MG: 1 INJECTION INTRAMUSCULAR; INTRAVENOUS at 09:36

## 2020-11-08 RX ADMIN — OXYCODONE HYDROCHLORIDE AND ACETAMINOPHEN 1 TABLET: 5; 325 TABLET ORAL at 19:06

## 2020-11-08 RX ADMIN — MIDAZOLAM 1 MG: 1 INJECTION INTRAMUSCULAR; INTRAVENOUS at 09:57

## 2020-11-08 RX ADMIN — ACETAMINOPHEN 650 MG: 325 TABLET, FILM COATED ORAL at 10:52

## 2020-11-08 RX ADMIN — LOSARTAN POTASSIUM 50 MG: 50 TABLET, FILM COATED ORAL at 08:14

## 2020-11-08 RX ADMIN — BUPIVACAINE HYDROCHLORIDE 15 ML: 2.5 INJECTION, SOLUTION EPIDURAL; INFILTRATION; INTRACAUDAL; PERINEURAL at 09:31

## 2020-11-08 RX ADMIN — SODIUM CHLORIDE: 4.5 INJECTION, SOLUTION INTRAVENOUS at 08:16

## 2020-11-08 RX ADMIN — MIDAZOLAM 1 MG: 1 INJECTION INTRAMUSCULAR; INTRAVENOUS at 09:22

## 2020-11-08 RX ADMIN — ASPIRIN 81 MG: 81 TABLET ORAL at 08:15

## 2020-11-08 RX ADMIN — FENTANYL CITRATE 50 MCG: 50 INJECTION, SOLUTION INTRAMUSCULAR; INTRAVENOUS at 09:24

## 2020-11-08 RX ADMIN — OMEPRAZOLE 40 MG: 20 CAPSULE, DELAYED RELEASE ORAL at 08:14

## 2020-11-08 RX ADMIN — CEFAZOLIN SODIUM 2 G: 2 INJECTION, SOLUTION INTRAVENOUS at 23:04

## 2020-11-08 RX ADMIN — Medication 400 MG: at 11:32

## 2020-11-08 RX ADMIN — LORAZEPAM 0.5 MG: 0.5 TABLET ORAL at 23:04

## 2020-11-08 RX ADMIN — POTASSIUM CHLORIDE 40 MEQ: 1500 TABLET, EXTENDED RELEASE ORAL at 11:32

## 2020-11-08 RX ADMIN — AMLODIPINE BESYLATE 5 MG: 5 TABLET ORAL at 12:30

## 2020-11-08 RX ADMIN — CARVEDILOL 25 MG: 25 TABLET, FILM COATED ORAL at 08:15

## 2020-11-08 RX ADMIN — CYANOCOBALAMIN TAB 1000 MCG 2000 MCG: 1000 TAB at 08:14

## 2020-11-08 RX ADMIN — OXYCODONE HYDROCHLORIDE AND ACETAMINOPHEN 1 TABLET: 5; 325 TABLET ORAL at 23:04

## 2020-11-08 RX ADMIN — MIDAZOLAM 1 MG: 1 INJECTION INTRAMUSCULAR; INTRAVENOUS at 09:30

## 2020-11-08 RX ADMIN — MIDAZOLAM 1 MG: 1 INJECTION INTRAMUSCULAR; INTRAVENOUS at 09:24

## 2020-11-08 RX ADMIN — AMIODARONE HYDROCHLORIDE 400 MG: 200 TABLET ORAL at 08:14

## 2020-11-08 RX ADMIN — LATANOPROST 1 DROP: 50 SOLUTION/ DROPS OPHTHALMIC at 20:22

## 2020-11-08 RX ADMIN — ATORVASTATIN CALCIUM 80 MG: 80 TABLET, FILM COATED ORAL at 20:22

## 2020-11-08 RX ADMIN — CEFAZOLIN SODIUM 2 G: 2 INJECTION, SOLUTION INTRAVENOUS at 08:36

## 2020-11-08 RX ADMIN — FENTANYL CITRATE 25 MCG: 50 INJECTION, SOLUTION INTRAMUSCULAR; INTRAVENOUS at 09:36

## 2020-11-08 ASSESSMENT — MIFFLIN-ST. JEOR: SCORE: 1640.78

## 2020-11-08 NOTE — PLAN OF CARE
VSS. No complaints of pain. Sating well on RA. L radial site soft, CMS intact, no bruising noted. HR SR/SB. Pt slept intermittently through out shift.

## 2020-11-08 NOTE — PROGRESS NOTES
"Cardiology Progress Note   Date of service: 20       Assessment and Plan:     1. 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  Patient inquires about getting off the amiodarone.  As he is still having ventricular tachycardia, would continue the 400 mg amiodarone dose for now.  In the future, may be able to wean down or change to sotalol if toxicity occurs.    Patient will follow up with his outpatient cardiologist.  He may follow-up with electrophysiology here if he likes.  I would also be happy to see him at any time if he desires.    Due to the monomorphic ventricular tachycardia seen and post implantation, would like to observe him overnight tonight and likely discharge tomorrow.                 Interval History:   Patient status post defibrillator placement.  He did require antitachycardia pacing for additional monomorphic VT.  He is very relieved that he did opt for the defibrillator.  He has questions about being on the amiodarone for long-term.              Review of Systems:   As per subjective, otherwise 5 systems reviewed and negative.           Physical Exam:     Vitals were reviewed  Blood pressure 108/72, pulse 68, temperature 98.1  F (36.7  C), temperature source Axillary, resp. rate 16, height 1.676 m (5' 6\"), weight 94.3 kg (207 lb 14.4 oz), SpO2 96 %.  Temperatures:  Current - Temp: 98.1  F (36.7  C); Max - Temp  Av.3  F (36.8  C)  Min: 98.1  F (36.7  C)  Max: 98.6  F (37  C)  Respiration range: Resp  Av  Min: 12  Max: 24  Pulse range: Pulse  Av.8  Min: 52  Max: 68  Blood pressure range: Systolic (24hrs), Av , Min:103 , Max:139   ; Diastolic (24hrs), Av, Min:61, Max:84    Pulse oximetry range: SpO2  Av.6 %  Min: 92 %  Max: 96 %    Intake/Output Summary (Last 24 hours) at 2020 1052  Last data filed at 2020 1830  Gross per 24 hour   Intake 600 ml   Output 200 ml   Net 400 ml "       Constitutional:   awake, alert, cooperative, no apparent distress, and appears stated age     Eyes:   Lids and lashes normal, pupils equal, round and reactive to light, extra ocular muscles intact, sclera clear, conjunctiva normal     Neck:   supple, symmetrical, trachea midline, no JVD     Back:   symmetric     Lungs:   Symmetric     Cardiovascular:   ICD incision bandaged, clean and dry     Abdomen:   benign     Musculoskeletal:   motor strength is 5 out of 5 all extremities bilaterally     Neurologic:   Grossly nonfocal     Skin:   normal skin color, texture, turgor                Medications:     Current Facility-Administered Medications Ordered in Epic   Medication Dose Route Frequency Last Rate Last Dose     acetaminophen (TYLENOL) Suppository 650 mg  650 mg Rectal Q4H PRN         acetaminophen (TYLENOL) tablet 650 mg  650 mg Oral Q4H PRN         amiodarone (PACERONE) tablet 400 mg  400 mg Oral Daily   400 mg at 11/08/20 0814     [Held by provider] amLODIPine (NORVASC) tablet 5 mg  5 mg Oral Daily         aspirin EC tablet 81 mg  81 mg Oral Daily   81 mg at 11/08/20 0815     atorvastatin (LIPITOR) tablet 80 mg  80 mg Oral QPM   80 mg at 11/07/20 2033     carboxymethylcellulose PF (REFRESH PLUS) 0.5 % ophthalmic solution 2 drop  2 drop Both Eyes Daily   2 drop at 11/08/20 0815     carvedilol (COREG) tablet 25 mg  25 mg Oral BID w/meals   25 mg at 11/08/20 0815     ceFAZolin (ANCEF) intermittent infusion 2 g in 100 mL dextrose PRE-MIX  2 g Intravenous Q8H         cyanocobalamin (VITAMIN B-12) tablet 2,000 mcg  2,000 mcg Oral Daily   2,000 mcg at 11/08/20 0814     HOLD:  Metformin and metformin containing medications if patient received IV contrast with acute kidney injury or severe chronic kidney disease (stage IV or stage V; i.e., eGFR less than 30)   Does not apply HOLD         HOLD: enoxaparin (LOVENOX) Post Procedure   Does not apply HOLD         HOLD: heparin (IV or Subcutaneous) Post Procedure   Does  not apply HOLD         HOLD: metformin and metformin containing medications on day of procedure and 48 hours after IV contrast given   Does not apply HOLD         ipratropium (ATROVENT) 0.06 % spray 2 spray  2 spray Both Nostrils TID PRN         latanoprost (XALATAN) 0.005 % ophthalmic solution 1 drop  1 drop Both Eyes At Bedtime   1 drop at 11/07/20 2034     lidocaine (LMX4) cream   Topical Q1H PRN         lidocaine 1 % 0.1-1 mL  0.1-1 mL Other Q1H PRN         LORazepam (ATIVAN) tablet 0.5 mg  0.5 mg Oral At Bedtime PRN         losartan (COZAAR) tablet 50 mg  50 mg Oral Daily   50 mg at 11/08/20 0814     magnesium oxide (MAG-OX) tablet 400 mg  400 mg Oral QPM         melatonin tablet 1 mg  1 mg Oral At Bedtime PRN         midazolam (VERSED) injection 0.5-1 mg  0.5-1 mg Intravenous Q5 Min PRN         mirtazapine (REMERON) tablet TABS 7.5 mg  7.5 mg Oral At Bedtime         naloxone (NARCAN) injection 0.1-0.4 mg  0.1-0.4 mg Intravenous Q2 Min PRN         nitroGLYcerin (NITROSTAT) sublingual tablet 0.4 mg  0.4 mg Sublingual Q5 Min PRN         omeprazole (priLOSEC) CR capsule 40 mg  40 mg Oral QAM   40 mg at 11/08/20 0814     ondansetron (ZOFRAN-ODT) ODT tab 4 mg  4 mg Oral Q6H PRN        Or     ondansetron (ZOFRAN) injection 4 mg  4 mg Intravenous Q6H PRN         oxyCODONE (ROXICODONE) tablet 5 mg  5 mg Oral TID PRN         oxyCODONE-acetaminophen (PERCOCET) 5-325 MG per tablet 1 tablet  1 tablet Oral Q4H PRN         Patient is already receiving anticoagulation with heparin, enoxaparin (LOVENOX), warfarin (COUMADIN)  or other anticoagulant medication   Does not apply Continuous PRN         polyethylene glycol (MIRALAX) Packet 17 g  17 g Oral Daily PRN         senna-docusate (SENOKOT-S/PERICOLACE) 8.6-50 MG per tablet 1 tablet  1 tablet Oral BID PRN        Or     senna-docusate (SENOKOT-S/PERICOLACE) 8.6-50 MG per tablet 2 tablet  2 tablet Oral BID PRN         sodium chloride (PF) 0.9% PF flush 3 mL  3 mL Intracatheter  q1 min prn         sodium chloride (PF) 0.9% PF flush 3 mL  3 mL Intracatheter Q8H   3 mL at 20 0818     sodium chloride 0.9% infusion   Intravenous Continuous 75 mL/hr at 20 1006       No current Epic-ordered outpatient medications on file.                Data:   All laboratory data reviewed  Results for orders placed or performed during the hospital encounter of 20 (from the past 24 hour(s))   Echocardiogram Limited    Narrative    [id number]  [id number]  [id number]  [id number]^GUS^MINH^TOBIAS           [facility]  Echocardiography Laboratory  [address] [place] [id number]        Name: KENYA MOSQUEDA  MRN: [id number]  : 1950  Study Date: 2020 11:14 AM  Age: 70 yrs  Gender: Male  Patient Location: [facility]  Reason For Study: VT  Ordering Physician: MINH WEBER  Referring Physician: MALICK VAUGHN  Performed By: Mona Huff     BSA: 2.0 m2  Height: 66 in  Weight: 211 lb  HR: 56  BP: 131/76 mmHg  _____________________________________________________________________________  __        Procedure  Limited Portable Echo Adult.  _____________________________________________________________________________  __        Interpretation Summary     1. Left ventricular systolic function is mildly reduced. The ejection fraction  is estimated at 40-45%. There is severe hypokinesis of the mid-apical segments  of the lateral and inferolateral walls.  2. The right ventricle is normal in size and function.  3. The left atrium is at least moderately dilated. Right atrial size is  normal.  4. No hemodynamically significant valvular disease.  No prior study.  _____________________________________________________________________________  __        Left Ventricle  The left ventricle is mildly dilated. There is mild eccentric left ventricular  hypertrophy. Left ventricular systolic function is mildly reduced. The visual  ejection fraction is estimated at 40-45%. There is severe  hypokinesis of the  mid-apical segments of the lateral and inferolateral walls.     Right Ventricle  The right ventricle is normal in size and function.     Atria  The left atrium is moderately dilated. Right atrial size is normal.     Mitral Valve  The mitral valve leaflets appear thickened, but open well. There is mild (1+)  mitral regurgitation.        Tricuspid Valve  Normal tricuspid valve. There is trace tricuspid regurgitation. Right  ventricular systolic pressure could not be approximated due to inadequate  tricuspid regurgitation.     Aortic Valve  The aortic valve is not well visualized.     Pulmonic Valve  The pulmonic valve is not well visualized.     Vessels  Normal size aorta. IVC diameter <2.1 cm collapsing >50% with sniff suggests a  normal RA pressure of 3 mmHg.     _____________________________________________________________________________  __  MMode/2D Measurements & Calculations  IVSd: 1.2 cm  LVIDd: 6.1 cm  LVIDs: 4.4 cm  LVPWd: 0.91 cm  FS: 27.4 %  LV mass(C)d: 263.9 grams  LV mass(C)dI: 129.0 grams/m2  RWT: 0.30                    _____________________________________________________________________________  __        Report approved by: Patricia Tejada 11/07/2020 12:01 PM      CBC with platelets   Result Value Ref Range    WBC 7.0 4.0 - 11.0 10e9/L    RBC Count 3.73 (L) 4.4 - 5.9 10e12/L    Hemoglobin 12.0 (L) 13.3 - 17.7 g/dL    Hematocrit 36.3 (L) 40.0 - 53.0 %    MCV 97 78 - 100 fl    MCH 32.2 26.5 - 33.0 pg    MCHC 33.1 31.5 - 36.5 g/dL    RDW 13.0 10.0 - 15.0 %    Platelet Count 313 150 - 450 10e9/L   INR   Result Value Ref Range    INR 1.27 (H) 0.86 - 1.14   Basic metabolic panel   Result Value Ref Range    Sodium 141 133 - 144 mmol/L    Potassium 3.7 3.4 - 5.3 mmol/L    Chloride 110 (H) 94 - 109 mmol/L    Carbon Dioxide 28 20 - 32 mmol/L    Anion Gap 3 3 - 14 mmol/L    Glucose 116 (H) 70 - 99 mg/dL    Urea Nitrogen 8 7 - 30 mg/dL    Creatinine 0.70 0.66 - 1.25 mg/dL    GFR  Estimate >90 >60 mL/min/[1.73_m2]    GFR Estimate If Black >90 >60 mL/min/[1.73_m2]    Calcium 8.9 8.5 - 10.1 mg/dL   Magnesium   Result Value Ref Range    Magnesium 1.7 1.6 - 2.3 mg/dL   EP Device    Narrative    PROCEDURES PERFORMED:  1. Conscious sedation.  2. Cardiac fluoroscopy.  3. Implantation of a dual-chamber defibrillator.    INDICATION: Ischemic cardiomyopathy and sustained ventricular tachycardia.    HPI: 70-year old gentleman with a history of hypertension, hyperlipidemia,   obstructive sleep apnea, paroxysmal atrial fibrillation and ischemic   cardiomyopathy (previous MI and PCI to RCA/PDA), who was admitted with   sustained monomorphic ventricular tachycardia (VT).  He was referred for   ICD implantation for secondary prevention.          Risks and benefits of the procedure were reviewed including but not   limited to arrhythmia, pain, infection, bleeding, skin damage from ionized   radiation, kidney damage or allergic reactions to conscious dye, injury to   the neighboring vascular structures, need for emergent cardiopulmonary   resuscitation, heart attack, stroke or death. Alternatives were discussed   including doing nothing. All questions were answered to the patient's   satisfaction. Informed consent was obtained and patient wished to proceed.    Guidelines ICD Indication:    CLASS I  1. ICD therapy is indicated in patients who are survivors of cardiac   arrest due to VF or hemodynamically unstable sustained VT after evaluation   to define the cause of the event and to exclude any completely reversible   causes. (Level of Evidence: A)    Based on Device Guidelines (2012 Update). JACC Vol. 61, No. 3, January 22, 2013:e6 -75.    FLUOROSCOPY DATA:  Fluoro time:  1 minute and 8 seconds.  Radiation dose (AK): 4 mGy.  Dose-area product (DAP):  201 Gy. Cm2.    DESCRIPTION OF PROCEDURE: After written informed consent was obtained,   patient was brought to the EP lab. An intravenous infusion of  prophylactic   antibiotic was begun. The chest was sterilely prepped from the level of   iliac crest to level of the chin with antibiotic soap and disinfectant,   draped appropriately. Following infiltration with 1% lidocaine, an   incision was made parallel to and 1 cm beneath the clavicle and extended   across the deltopectoral groove. Using blunt dissection, the excision was   extended down the anterior pectoral fascia. Using blunt and sharp   dissection, a pocket was developed parallel to the fascia and extended   inferiorly.    Two pocket punctures via fluoroscopy guidance and modified Seldinger's   technique were used to attain access into the left subclavian vein. A 9   Malagasy sheath was inserted over the wire. The right ventricular   defibrillator lead was advanced under fluoroscopy and a secure location   found. The lead was fixated.  Stimulation and sensing thresholds were   found to be satisfactory. No diaphragmatic stimulation was noticed with   high output pacing. The lead was sutured to the underlying pectoral muscle   with interrupted 0 silk suture over a plastic collar.   A 7 Malagasy sheath   was inserted over the wire. The right atrial lead was advanced under   fluoroscopy and a secure location found. The lead was fixated. Stimulation   and sensing thresholds were found to be satisfactory. No diaphragmatic   stimulation was noticed with high output pacing. The lead was sutured to   the underlying pectoral muscle with interrupted 0 silk suture over a   plastic collar.    After irrigation with saline solution, the pocket was inspected and no   bleeding was seen.  The generator was connected to the leads and found to   perform appropriately. The generator was inserted into the pocket, and the   wound was closed with subcutaneous sutures. A pressure dressing was   applied.    Of note, patient developed monomorphic ventricular tachycardia (VT)   spontaneously  after device implantation.  Ventricular  tachycardia had the   same morphology as the initial VT, however heart rate was slower (160   bpm).  Several attempts of ATP were performed. Tachycardia was   successfully terminated by ATP at 78% of cycle length for 12 cycles.     DEFIBRILLATOR GENERATOR: [employer], model D433, serial #[id number].    LEAD INFORMATION:  Right atrial lead: [employer], model 7840, serial #[id number].  Right ventricular lead: [employer], model 0272, serial #[id number].     MEASURED DATA:  Right atrial lead: Sensing 3.7 mV, threshold 0.8 volt at 0.4 msec,   impedance 489 ohms.  Right ventricular lead: Sensing 11.8 mV, threshold 1.0 volt at 0.4 msec,   impedance 882 ohms, shock impedance 45 ohms.    IMPRESSION:  1. Successful dual chamber ICD implantation.  2. Tachy therapies were programmed to 3 zones: VT-1> 140 bpm (monitor zone   only), VT-2> 160 bpm (ATP x2 and 41J x6) and VF> 240 bpm (ATP and 41J x8).  3. Bradycardia pacing set to DDD 60 to 130 bpm.    RECOMMENDATIONS:  1. Avoid vascular access to the left jugular and subclavian veins.  2. Keep wound clean, dry and covered for seven days.  3. PA and lateral chest x-ray to rule out dislodgement.  4. Device interrogation prior to discharge.  5. Left arm in sling overnight and then off in the morning.  6. May start oral medications. Avoid IV or subcutaneous heparin for at   least two weeks. If heparin must be used, please contract the procedural   team to discuss.  7. Wound care as follows:  a) Do not get incision wet for three days.  b) Leave the Steri-Strips in place, allow to come off naturally. If they   do not come off in two weeks, you may remove them.  c) Check incision daily and call if they notice one of the following:   redness, drainage (pus or blood), swelling, warmth or if you develop   fever.    If you have questions regarding wound care, please contact our office.    Amari Jacob MD                  All laboratory data reviewed  No results found for:  CHOL  No results found for: HDL  No results found for: LDL  No results found for: TRIG  No results found for: CHOLHDLRATIO  No results found for: TSH  Last Basic Metabolic Panel:  Lab Results   Component Value Date     11/08/2020      Lab Results   Component Value Date    POTASSIUM 3.7 11/08/2020     Lab Results   Component Value Date    CHLORIDE 110 11/08/2020     Lab Results   Component Value Date    BOBBY 8.9 11/08/2020     Lab Results   Component Value Date    CO2 28 11/08/2020     Lab Results   Component Value Date    BUN 8 11/08/2020     Lab Results   Component Value Date    CR 0.70 11/08/2020     Lab Results   Component Value Date     11/08/2020     Lab Results   Component Value Date    WBC 7.0 11/08/2020     Lab Results   Component Value Date    RBC 3.73 11/08/2020     Lab Results   Component Value Date    HGB 12.0 11/08/2020     Lab Results   Component Value Date    HCT 36.3 11/08/2020     Lab Results   Component Value Date    MCV 97 11/08/2020     Lab Results   Component Value Date    MCH 32.2 11/08/2020     Lab Results   Component Value Date    MCHC 33.1 11/08/2020     Lab Results   Component Value Date    RDW 13.0 11/08/2020     Lab Results   Component Value Date     11/08/2020

## 2020-11-08 NOTE — PROGRESS NOTES
[facility]    Hospitalist Progress Note      Assessment & Plan   Candido Doe is a 70 year old male who was admitted on 11/6/2020 with chest pain, wide complex rhythm seen on EKG, defibrillated back into sinus rhythm with improvement in rhythm seen with initiation of amiodarone    Wide complex v tach s/p cardioversion x3  Cardiomyopathy, suspected stress induced  Reported 1 hr onset of chest tightness/heaviness, called PCP who recommended EMS transport to hospital. Patient was noted to be hypotensive on home BP cuff (SBP 70s per wife). EMS gave 12mg adenosine without conversion, then cardioverted x3, after first shock was pulseless v-fib then after 2 more shocks eventual return to sinus rhythm with occasional vtach pulses. Given amiodarone bolus, then started on gtt with stabilization of rhythm to sinus. Trop 0.034--1.219---1.329 (elevation seen secondary to defibrillation on day of admission). Patient reports resolution of chest tightness. Strips reviewed show wide complex v tach, then sinus with occasional wide complex bursts. EKG sinus.  *cardiac cath 11/7: no significant obstructive disease, small marginal branch old occlusion too small for intervention  *echocardiogram 11/7: EF 40-45%, LVSF mildly reduced. Severe hypokinesis in mid-apical segments of the lateral and inferolateral walls. Left atrium moderately dilated.  *AICD placement 11/8, [employer], model D433, serial #[id number]. Did have episode of slower vtach (160s) after placement, with termination by antitachycardia pacing  - Initially on amiodarone gtt, switched to 400mg once daily for ventricular arrhthymia dosing, discharge on 400mg daily dosing, given 30 day supply  - Cardiology consult appreciated  - Keep K close to 4 and mag close to 2 (he had been refusing his evening magnesium while inpatient)  - resume xarelto on 11/9 evening  - prn nitroglycerin  - Needs device check 11/9 AM and CXR to rule out  dislodgement  - Follow up with primary cardiologist, Dr Rizvi after discharge  - BMP in one week to monitor potassium     Atrial fibrillation  Hypertension  Hx CAD, STEMI s/p PCI with ROGELIO to RCA 8/2014  Echo 10/12/20: EF 50-55%, inferior wall and posterior wall abnormal, enlarged left atrium. Mitral valve sclerotic, trace mitral regurg, ascending aorta dilated to 3.9cm, aortic sinus 4cm.  PTA amlodipine 5mg daily, coreg 25mg BID, losartan 50mg daily, asa 81mg daily, xarelto 20mg daily. lipitor 80mg daily  - resume xarelto on 11/9 PM  - Resume low dose amlodipine and trend BPs throughout 11/8  - Continue PTA coreg, losartan, ASA, lipitor     Recent right knee replacement  ~2 weeks prior. Following with outpatient therapies, last seen on 11/5  - PT consult here     Anemia, improving  Presume secondary to recent blood loss anemia with right knee replacement  - Hgb 11.6 on admit, improved to 12.1 AM of 11/7  - Trend CBC in AM     Obstructive sleep apnea   Has not been using his CPAP in the past 2 weeks, does not want it while here.     GERD  PTA omeprazole 40mg daily, continue while inpatient     Depression  PTA remeron at bedtime, continued     Asymptomatic COVID-19 screening-negative  No symptoms. Screened as asymptomatic, urgent for procedure    DVT Prophylaxis: heparin gtt  Code Status: Full Code  Expected discharge: 24 hours, recommended to prior living arrangement once device check and CXR completed in AM    Bria Roche, DO  Text Page (7am - 6pm)    Interval History   Patient seen and examined. Having pain in his left shoulder post-procedure. He was given oxycodone and tylenol with little improvement in discomfort. Discussed magnesium and potassium and goals to keep these levels a little higher in setting of arrhythmia. Discussed his concerns about amiodarone, he is willing to give it a trial, but wants to be off of it as soon as possible and will discuss with his cardiologist.  Wife at bedside.    -Data  reviewed today: I reviewed all new labs and imaging results over the last 24 hours. I personally reviewed no images or EKG's today.    Physical Exam   Temp: 98.1  F (36.7  C) Temp src: Axillary BP: 123/80 Pulse: 60   Resp: 16 SpO2: 97 % O2 Device: Nasal cannula Oxygen Delivery: 3 LPM  Vitals:    11/06/20 1230 11/07/20 0647 11/08/20 0629   Weight: 96.7 kg (213 lb 3 oz) 95.9 kg (211 lb 8 oz) 94.3 kg (207 lb 14.4 oz)     Vital Signs with Ranges  Temp:  [98.1  F (36.7  C)-98.6  F (37  C)] 98.1  F (36.7  C)  Pulse:  [52-68] 60  Resp:  [12-24] 16  BP: (103-139)/(61-84) 123/80  SpO2:  [92 %-97 %] 97 %  I/O last 3 completed shifts:  In: 1379.5 [P.O.:600; I.V.:779.5]  Out: 200 [Urine:200]    Constitutional: Awake, alert, cooperative, no apparent distress  Respiratory: Clear to auscultation bilaterally, no crackles or wheezing  Cardiovascular: regular rate, regular rhythm, normal S1 and S2, and no murmur noted  GI: Normal bowel sounds, soft, non-distended, non-tender  Skin/Integumen: No rashes, no cyanosis, no edema  Other: Left chest with new device in place, no hematoma, pressure dressing in place    Medications     - MEDICATION INSTRUCTIONS -       sodium chloride 75 mL/hr at 11/07/20 1006       amiodarone  400 mg Oral Daily     amLODIPine  5 mg Oral Daily     aspirin  81 mg Oral Daily     atorvastatin  80 mg Oral QPM     carboxymethylcellulose PF  2 drop Both Eyes Daily     carvedilol  25 mg Oral BID w/meals     ceFAZolin  2 g Intravenous Q8H     cyanocobalamin  2,000 mcg Oral Daily     latanoprost  1 drop Both Eyes At Bedtime     losartan  50 mg Oral Daily     magnesium oxide  400 mg Oral QPM     mirtazapine  7.5 mg Oral At Bedtime     omeprazole  40 mg Oral QAM     sodium chloride (PF)  3 mL Intracatheter Q8H       Data   Recent Labs   Lab 11/08/20  0535 11/07/20  0535 11/06/20  2243 11/06/20  1728 11/06/20  1243 11/06/20  1240   WBC 7.0 8.2  --  8.9  --  9.4   HGB 12.0* 12.1*  --  11.4* 11.6* 11.3*   MCV 97 98  --  97   --  98    382  --  363  --  376   INR 1.27*  --   --   --   --  1.75*    138  --   --  140 137   POTASSIUM 3.7 3.8  --   --  3.8 3.8   CHLORIDE 110* 107  --   --   --  106   CO2 28 26  --   --   --  25   BUN 8 8  --   --   --  13   CR 0.70 0.83  --   --   --  1.00   ANIONGAP 3 5  --   --   --  6   BOBBY 8.9 8.9  --   --   --  8.6   * 121*  --   --   --  187*   TROPI  --   --  1.329* 1.219*  --  0.034       Recent Results (from the past 24 hour(s))   EP Device    Narrative    PROCEDURES PERFORMED:  1. Conscious sedation.  2. Cardiac fluoroscopy.  3. Implantation of a dual-chamber defibrillator.    INDICATION: Ischemic cardiomyopathy and sustained ventricular tachycardia.    HPI: 70-year old gentleman with a history of hypertension, hyperlipidemia,   obstructive sleep apnea, paroxysmal atrial fibrillation and ischemic   cardiomyopathy (previous MI and PCI to RCA/PDA), who was admitted with   sustained monomorphic ventricular tachycardia (VT).  He was referred for   ICD implantation for secondary prevention.          Risks and benefits of the procedure were reviewed including but not   limited to arrhythmia, pain, infection, bleeding, skin damage from ionized   radiation, kidney damage or allergic reactions to conscious dye, injury to   the neighboring vascular structures, need for emergent cardiopulmonary   resuscitation, heart attack, stroke or death. Alternatives were discussed   including doing nothing. All questions were answered to the patient's   satisfaction. Informed consent was obtained and patient wished to proceed.    Guidelines ICD Indication:    CLASS I  1. ICD therapy is indicated in patients who are survivors of cardiac   arrest due to VF or hemodynamically unstable sustained VT after evaluation   to define the cause of the event and to exclude any completely reversible   causes. (Level of Evidence: A)    Based on Device Guidelines (2012 Update). JACC Vol. 61, No. 3, January 22,    2013:e6 -75.    FLUOROSCOPY DATA:  Fluoro time:  1 minute and 8 seconds.  Radiation dose (AK): 4 mGy.  Dose-area product (DAP):  201 Gy. Cm2.    DESCRIPTION OF PROCEDURE: After written informed consent was obtained,   patient was brought to the EP lab. An intravenous infusion of prophylactic   antibiotic was begun. The chest was sterilely prepped from the level of   iliac crest to level of the chin with antibiotic soap and disinfectant,   draped appropriately. Following infiltration with 1% lidocaine, an   incision was made parallel to and 1 cm beneath the clavicle and extended   across the deltopectoral groove. Using blunt dissection, the excision was   extended down the anterior pectoral fascia. Using blunt and sharp   dissection, a pocket was developed parallel to the fascia and extended   inferiorly.    Two pocket punctures via fluoroscopy guidance and modified Seldinger's   technique were used to attain access into the left subclavian vein. A 9   Urdu sheath was inserted over the wire. The right ventricular   defibrillator lead was advanced under fluoroscopy and a secure location   found. The lead was fixated.  Stimulation and sensing thresholds were   found to be satisfactory. No diaphragmatic stimulation was noticed with   high output pacing. The lead was sutured to the underlying pectoral muscle   with interrupted 0 silk suture over a plastic collar.   A 7 Urdu sheath   was inserted over the wire. The right atrial lead was advanced under   fluoroscopy and a secure location found. The lead was fixated. Stimulation   and sensing thresholds were found to be satisfactory. No diaphragmatic   stimulation was noticed with high output pacing. The lead was sutured to   the underlying pectoral muscle with interrupted 0 silk suture over a   plastic collar.    After irrigation with saline solution, the pocket was inspected and no   bleeding was seen.  The generator was connected to the leads and found to   perform  appropriately. The generator was inserted into the pocket, and the   wound was closed with subcutaneous sutures. A pressure dressing was   applied.    Of note, patient developed monomorphic ventricular tachycardia (VT)   spontaneously  after device implantation.  Ventricular tachycardia had the   same morphology as the initial VT, however heart rate was slower (160   bpm).  Several attempts of ATP were performed. Tachycardia was   successfully terminated by ATP at 78% of cycle length for 12 cycles.     DEFIBRILLATOR GENERATOR: [employer], model D433, serial #[id number].    LEAD INFORMATION:  Right atrial lead: [employer], model 7840, serial #[id number].  Right ventricular lead: [employer], model 0272, serial #[id number].     MEASURED DATA:  Right atrial lead: Sensing 3.7 mV, threshold 0.8 volt at 0.4 msec,   impedance 489 ohms.  Right ventricular lead: Sensing 11.8 mV, threshold 1.0 volt at 0.4 msec,   impedance 882 ohms, shock impedance 45 ohms.    IMPRESSION:  1. Successful dual chamber ICD implantation.  2. Tachy therapies were programmed to 3 zones: VT-1> 140 bpm (monitor zone   only), VT-2> 160 bpm (ATP x2 and 41J x6) and VF> 240 bpm (ATP and 41J x8).  3. Bradycardia pacing set to DDD 60 to 130 bpm.    RECOMMENDATIONS:  1. Avoid vascular access to the left jugular and subclavian veins.  2. Keep wound clean, dry and covered for seven days.  3. PA and lateral chest x-ray to rule out dislodgement.  4. Device interrogation prior to discharge.  5. Left arm in sling overnight and then off in the morning.  6. May start oral medications. Avoid IV or subcutaneous heparin for at   least two weeks. If heparin must be used, please contract the procedural   team to discuss.  7. Wound care as follows:  a) Do not get incision wet for three days.  b) Leave the Steri-Strips in place, allow to come off naturally. If they   do not come off in two weeks, you may remove them.  c) Check incision daily and call if they  notice one of the following:   redness, drainage (pus or blood), swelling, warmth or if you develop   fever.    If you have questions regarding wound care, please contact our office.    Amari Jacob MD

## 2020-11-08 NOTE — PLAN OF CARE
Pt A/O, up in room SBA. VSS on RA. Tele: SB. Pt denies pain/SOB. Catheterization done with left radial site, C/D/I and CMS intact. Echo done. NPO at midnight for ICD placement.

## 2020-11-08 NOTE — CONSULTS
Electrophysiology/ Cardiology- Consult Note         H&P and Plan:     Reason for consult: Ventricular tachycardia.      History of present illness: 70-year old gentleman with a history of hypertension, hyperlipidemia, obstructive sleep apnea, paroxysmal atrial fibrillation and ischemic cardiomyopathy (previous MI and PCI to RCA/PDA), who was admitted with sustained monomorphic VT.  He was referred for ICD implantation.      Patient was admitted on 11/06 with chest discomfort in the setting of sustained monomorphic VT.  He was defibrillated and started on amiodarone.  Angiogram was performed and ruled out new significant coronary disease.     At the moment, he is doing well and has no complaints.  He denies any sense of chest pain, shortness of breath, lightheadedness, near-syncope or syncope.    EKG/telemetry was reviewed.  Initial EKG showed wide-complex tachycardia with RBBB pattern and superior axis.  Heart rate was around 200 bpm.  EKG in normal sinus rhythm reviewed Q waves in inferior leads.  Telemetry showed runs of nonsustained VT.    Previous studies:      Echocardiogram (11/07): Mild LV dysfunction.  EF estimated 40-45%.  There was severe hypokinesis of the mid-apical segments of the lateral and inferolateral walls.    Plan:  1.  Monomorphic VT in the setting of ischemic cardiomyopathy.  I agree with indication for ICD implantation for secondary prevention.    I was explained the procedure in details to patient and his wife.  They understand there is a 1-2% risk complication associated procedure.  Consent was signed.     Recommendations:  -ICD implantation today.  -Continue therapy with amiodarone and Coreg.  Consider switching to sotalol in 2 to 3 months.    2.  Paroxysmal atrial fibrillation.  [id number] score of 3.  Patient was previously on Xarelto.  Okay to resume Xarelto tomorrow.  3.  Hypertension.  BP is well controlled.      Amari Jacob MD    Physical Exam:  Vitals: /84 (BP Location:  "Right arm)   Pulse 65   Temp 98.1  F (36.7  C) (Axillary)   Resp 16   Ht 1.676 m (5' 6\")   Wt 94.3 kg (207 lb 14.4 oz)   SpO2 95%   BMI 33.56 kg/m        Intake/Output Summary (Last 24 hours) at 11/8/2020 0847  Last data filed at 11/7/2020 1830  Gross per 24 hour   Intake 1379.5 ml   Output 200 ml   Net 1179.5 ml     Vitals:    11/06/20 1230 11/07/20 0647 11/08/20 0629   Weight: 96.7 kg (213 lb 3 oz) 95.9 kg (211 lb 8 oz) 94.3 kg (207 lb 14.4 oz)       Constitutional:  AAO x3.  Pt is in NAD.  HEAD: Normocephalic.  SKIN: Skin normal color, texture and turgor with no lesions or eruptions.  Eyes: PERRL, EOMI.  ENT:  Supple, normal JVP. No lymphadenopathy or thyroid enlargement.  Chest:  CTAB.  Cardiac:  RRR, normal  S1 and S2.  No murmurs rubs or gallop.    Abdomen:  Normal BS.  Soft, non-tender and non-distended.  No rebound or guarding.    Extremities:  Pedious pulses palpable B/L.  No LE edema noticed.   Neurological: Strength and sensation grossly symmetric and intact throughout.         Review of Systems:  Complete review of system is otherwise negative with the exception of what was described above.     CURRENT MEDICATIONS:    amiodarone  400 mg Oral Daily     [Held by provider] amLODIPine  5 mg Oral Daily     aspirin  81 mg Oral Daily     atorvastatin  80 mg Oral QPM     carboxymethylcellulose PF  2 drop Both Eyes Daily     carvedilol  25 mg Oral BID w/meals     ceFAZolin  2 g Intravenous Pre-Op/Pre-procedure x 1 dose     cyanocobalamin  2,000 mcg Oral Daily     latanoprost  1 drop Both Eyes At Bedtime     losartan  50 mg Oral Daily     magnesium oxide  400 mg Oral QPM     mirtazapine  7.5 mg Oral At Bedtime     omeprazole  40 mg Oral QAM     sodium chloride (PF)  3 mL Intracatheter Q8H     PRN Meds: acetaminophen, acetaminophen, HOLD MEDICATION, ipratropium, lidocaine 4%, lidocaine (buffered or not buffered), LORazepam, melatonin, midazolam, naloxone, nitroGLYcerin, ondansetron **OR** ondansetron, " oxyCODONE, - MEDICATION INSTRUCTIONS -, polyethylene glycol, senna-docusate **OR** senna-docusate, sodium chloride (PF)    ALLERGIES     Allergies   Allergen Reactions     No Clinical Screening - See Comments Angioedema     Ate Escargot     Slug Extract  [Snail Extract] Angioedema       PAST MEDICAL HISTORY:  Past Medical History:   Diagnosis Date     Atrial fibrillation (H)      CAD (coronary artery disease)      Gastroesophageal reflux disease with esophagitis without hemorrhage      Hyperlipidemia LDL goal <100      Hypertension      Myocardial infarction (H)      Sleep apnea        PAST SURGICAL HISTORY:  Past Surgical History:   Procedure Laterality Date     ARTHROPLASTY PATELLO-FEMORAL (KNEE)  11/2020     CARDIAC SURGERY      2 stents for MI     CHOLECYSTECTOMY       GENITOURINARY SURGERY      vasectomy     HERNIA REPAIR       ORTHOPEDIC SURGERY      left hip     right knee replacement Right        FAMILY HISTORY:  Family History   Problem Relation Age of Onset     Heart Failure Mother      LUNG DISEASE Mother      Myocardial Infarction Father      Heart Failure Father        SOCIAL HISTORY:  Social History     Socioeconomic History     Marital status:      Spouse name: None     Number of children: None     Years of education: None     Highest education level: None   Occupational History     None   Social Needs     Financial resource strain: None     Food insecurity     Worry: None     Inability: None     Transportation needs     Medical: None     Non-medical: None   Tobacco Use     Smoking status: Former Smoker     Tobacco comment: quit 35-40 years ago   Substance and Sexual Activity     Alcohol use: Yes     Comment: occ     Drug use: Never     Sexual activity: None   Lifestyle     Physical activity     Days per week: None     Minutes per session: None     Stress: None   Relationships     Social connections     Talks on phone: None     Gets together: None     Attends Yazdanism service: None     Active  member of club or organization: None     Attends meetings of clubs or organizations: None     Relationship status: None     Intimate partner violence     Fear of current or ex partner: None     Emotionally abused: None     Physically abused: None     Forced sexual activity: None   Other Topics Concern     None   Social History Narrative     None         Recent Lab Results:  Recent Labs   Lab 11/08/20  0535 11/07/20  0535 11/06/20  2243 11/06/20  1728 11/06/20  1243 11/06/20  1240   WBC 7.0 8.2  --  8.9  --  9.4   HGB 12.0* 12.1*  --  11.4* 11.6* 11.3*   MCV 97 98  --  97  --  98    382  --  363  --  376   INR 1.27*  --   --   --   --  1.75*    138  --   --  140 137   POTASSIUM 3.7 3.8  --   --  3.8 3.8   CHLORIDE 110* 107  --   --   --  106   CO2 28 26  --   --   --  25   BUN 8 8  --   --   --  13   CR 0.70 0.83  --   --   --  1.00   ANIONGAP 3 5  --   --   --  6   BOBBY 8.9 8.9  --   --   --  8.6   * 121*  --   --   --  187*   TROPI  --   --  1.329* 1.219*  --  0.034

## 2020-11-08 NOTE — PRE-PROCEDURE
GENERAL PRE-PROCEDURE:   Procedure:  ICD implantation  Date/Time:  11/8/2020 9:02 AM    Written consent obtained?: Yes    Risks and benefits: Risks, benefits and alternatives were discussed    Consent given by:  Patient  Patient states understanding of procedure being performed: Yes    Patient's understanding of procedure matches consent: Yes    Procedure consent matches procedure scheduled: Yes    Expected level of sedation:  Moderate  Appropriately NPO:  Yes  ASA Class:  Class 4- Severe systemic disease, acute unstable problems  Mallampati  :  Grade 2- soft palate, base of uvula, tonsillar pillars, and portion of posterior pharyngeal wall visible  Lungs:  Lungs clear with good breath sounds bilaterally  Heart:  Normal heart sounds and rate  History & Physical reviewed:  History and physical reviewed and no updates needed  Statement of review:  I have reviewed the lab findings, diagnostic data, medications, and the plan for sedation

## 2020-11-08 NOTE — PLAN OF CARE
ICD placed this AM. VSS on RA. Tele- APaced. PPM site with pressure dressing, CDI. Ice applied for pain, also PRN Percocet. Left radial site from yesterdays angiogram, CDI. Pt had episode of VTach in cath lab after ICD placement, has had none since. One time dose of Mag and K+ administered. Restarted amlodipine. Plan to restart Xarelto tomorrow. Cardiac diet. SBA. Right total knee replacement x2 weeks ago, incision CDI. Continue to monitor.

## 2020-11-09 ENCOUNTER — APPOINTMENT (OUTPATIENT)
Dept: PHYSICAL THERAPY | Facility: CLINIC | Age: 70
DRG: 225 | End: 2020-11-09
Attending: HOSPITALIST
Payer: MEDICARE

## 2020-11-09 ENCOUNTER — TELEPHONE (OUTPATIENT)
Dept: CARDIOLOGY | Facility: CLINIC | Age: 70
End: 2020-11-09

## 2020-11-09 ENCOUNTER — APPOINTMENT (OUTPATIENT)
Dept: GENERAL RADIOLOGY | Facility: CLINIC | Age: 70
DRG: 225 | End: 2020-11-09
Attending: INTERNAL MEDICINE
Payer: MEDICARE

## 2020-11-09 VITALS
SYSTOLIC BLOOD PRESSURE: 132 MMHG | RESPIRATION RATE: 20 BRPM | WEIGHT: 207.9 LBS | OXYGEN SATURATION: 97 % | DIASTOLIC BLOOD PRESSURE: 88 MMHG | TEMPERATURE: 98.3 F | HEIGHT: 66 IN | HEART RATE: 63 BPM | BODY MASS INDEX: 33.41 KG/M2

## 2020-11-09 DIAGNOSIS — I49.9 VENTRICULAR ARRHYTHMIA: Primary | ICD-10-CM

## 2020-11-09 DIAGNOSIS — I42.9 CARDIOMYOPATHY (H): ICD-10-CM

## 2020-11-09 DIAGNOSIS — Z95.810 ICD (IMPLANTABLE CARDIOVERTER-DEFIBRILLATOR) IN PLACE: ICD-10-CM

## 2020-11-09 LAB
ALBUMIN SERPL-MCNC: 3 G/DL (ref 3.4–5)
ALP SERPL-CCNC: 99 U/L (ref 40–150)
ALT SERPL W P-5'-P-CCNC: 16 U/L (ref 0–70)
ANION GAP SERPL CALCULATED.3IONS-SCNC: 5 MMOL/L (ref 3–14)
AST SERPL W P-5'-P-CCNC: 34 U/L (ref 0–45)
BILIRUB DIRECT SERPL-MCNC: 0.2 MG/DL (ref 0–0.2)
BILIRUB SERPL-MCNC: 0.6 MG/DL (ref 0.2–1.3)
BUN SERPL-MCNC: 9 MG/DL (ref 7–30)
CALCIUM SERPL-MCNC: 8.6 MG/DL (ref 8.5–10.1)
CHLORIDE SERPL-SCNC: 108 MMOL/L (ref 94–109)
CO2 SERPL-SCNC: 28 MMOL/L (ref 20–32)
CREAT SERPL-MCNC: 0.76 MG/DL (ref 0.66–1.25)
ERYTHROCYTE [DISTWIDTH] IN BLOOD BY AUTOMATED COUNT: 13.1 % (ref 10–15)
GFR SERPL CREATININE-BSD FRML MDRD: >90 ML/MIN/{1.73_M2}
GLUCOSE SERPL-MCNC: 101 MG/DL (ref 70–99)
HCT VFR BLD AUTO: 36.4 % (ref 40–53)
HGB BLD-MCNC: 12 G/DL (ref 13.3–17.7)
MAGNESIUM SERPL-MCNC: 1.8 MG/DL (ref 1.6–2.3)
MCH RBC QN AUTO: 32.4 PG (ref 26.5–33)
MCHC RBC AUTO-ENTMCNC: 33 G/DL (ref 31.5–36.5)
MCV RBC AUTO: 98 FL (ref 78–100)
PLATELET # BLD AUTO: 277 10E9/L (ref 150–450)
POTASSIUM SERPL-SCNC: 3.9 MMOL/L (ref 3.4–5.3)
PROT SERPL-MCNC: 6.6 G/DL (ref 6.8–8.8)
RBC # BLD AUTO: 3.7 10E12/L (ref 4.4–5.9)
SODIUM SERPL-SCNC: 141 MMOL/L (ref 133–144)
T4 FREE SERPL-MCNC: 1.17 NG/DL (ref 0.76–1.46)
TSH SERPL DL<=0.005 MIU/L-ACNC: 6.51 MU/L (ref 0.4–4)
WBC # BLD AUTO: 6.9 10E9/L (ref 4–11)

## 2020-11-09 PROCEDURE — 36415 COLL VENOUS BLD VENIPUNCTURE: CPT | Performed by: HOSPITALIST

## 2020-11-09 PROCEDURE — 97161 PT EVAL LOW COMPLEX 20 MIN: CPT | Mod: GP

## 2020-11-09 PROCEDURE — 83735 ASSAY OF MAGNESIUM: CPT | Performed by: HOSPITALIST

## 2020-11-09 PROCEDURE — 80076 HEPATIC FUNCTION PANEL: CPT | Performed by: HOSPITALIST

## 2020-11-09 PROCEDURE — 80048 BASIC METABOLIC PNL TOTAL CA: CPT | Performed by: HOSPITALIST

## 2020-11-09 PROCEDURE — 250N000013 HC RX MED GY IP 250 OP 250 PS 637: Performed by: INTERNAL MEDICINE

## 2020-11-09 PROCEDURE — 250N000011 HC RX IP 250 OP 636: Performed by: HOSPITALIST

## 2020-11-09 PROCEDURE — 99239 HOSP IP/OBS DSCHRG MGMT >30: CPT | Performed by: HOSPITALIST

## 2020-11-09 PROCEDURE — 250N000013 HC RX MED GY IP 250 OP 250 PS 637: Performed by: HOSPITALIST

## 2020-11-09 PROCEDURE — 84443 ASSAY THYROID STIM HORMONE: CPT | Performed by: HOSPITALIST

## 2020-11-09 PROCEDURE — 99232 SBSQ HOSP IP/OBS MODERATE 35: CPT | Performed by: INTERNAL MEDICINE

## 2020-11-09 PROCEDURE — 85027 COMPLETE CBC AUTOMATED: CPT | Performed by: HOSPITALIST

## 2020-11-09 PROCEDURE — 97110 THERAPEUTIC EXERCISES: CPT | Mod: GP

## 2020-11-09 PROCEDURE — 71046 X-RAY EXAM CHEST 2 VIEWS: CPT

## 2020-11-09 PROCEDURE — 93010 ELECTROCARDIOGRAM REPORT: CPT | Performed by: INTERNAL MEDICINE

## 2020-11-09 PROCEDURE — 93005 ELECTROCARDIOGRAM TRACING: CPT

## 2020-11-09 PROCEDURE — 84439 ASSAY OF FREE THYROXINE: CPT | Performed by: HOSPITALIST

## 2020-11-09 RX ORDER — MAGNESIUM OXIDE 400 MG/1
400 TABLET ORAL ONCE
Status: COMPLETED | OUTPATIENT
Start: 2020-11-09 | End: 2020-11-09

## 2020-11-09 RX ADMIN — CARBOXYMETHYLCELLULOSE SODIUM 2 DROP: 5 SOLUTION/ DROPS OPHTHALMIC at 09:25

## 2020-11-09 RX ADMIN — AMIODARONE HYDROCHLORIDE 400 MG: 200 TABLET ORAL at 09:25

## 2020-11-09 RX ADMIN — CYANOCOBALAMIN TAB 1000 MCG 2000 MCG: 1000 TAB at 09:24

## 2020-11-09 RX ADMIN — OXYCODONE HYDROCHLORIDE AND ACETAMINOPHEN 1 TABLET: 5; 325 TABLET ORAL at 06:06

## 2020-11-09 RX ADMIN — CARVEDILOL 25 MG: 25 TABLET, FILM COATED ORAL at 09:25

## 2020-11-09 RX ADMIN — CEFAZOLIN SODIUM 2 G: 2 INJECTION, SOLUTION INTRAVENOUS at 09:38

## 2020-11-09 RX ADMIN — Medication 400 MG: at 09:24

## 2020-11-09 RX ADMIN — ASPIRIN 81 MG: 81 TABLET ORAL at 09:25

## 2020-11-09 RX ADMIN — AMLODIPINE BESYLATE 5 MG: 5 TABLET ORAL at 09:25

## 2020-11-09 RX ADMIN — LOSARTAN POTASSIUM 50 MG: 50 TABLET, FILM COATED ORAL at 09:25

## 2020-11-09 RX ADMIN — OMEPRAZOLE 40 MG: 20 CAPSULE, DELAYED RELEASE ORAL at 09:25

## 2020-11-09 NOTE — PLAN OF CARE
VSS. Pt complained of pain to L ICD site, given oxycodone x2. HR Apaced. Pt up SBA. Sating well on RA.

## 2020-11-09 NOTE — PROGRESS NOTES
"  Cardiology Progress Note          Assessment and Plan:   Post dual chamber [employer] ICD implantation. No complications.  History of CAD with MI, sp distal RCA stenting. EF 45%. Presented with sustained monomorphic VT, followed by frequent runs of nonsustained VT. Coronary angiography showed no severe stenosis. VT has been controlled by amiodarone.  Some discomfort over the ICD site. No other complaints.  HO: paroxysmal AF, hypertension, VENTURA, GERD.    Agreed to continue amiodarone loading.  Ok for discharge today.  Pt would like to return to his previous cardiologist at [facility] for follow up and possible switch from amiodarone to sotalol.  Calvin Montanez MD  Cardiology   [phone]                  Physical Exam:   Blood pressure 128/86, pulse 62, temperature 98.3  F (36.8  C), resp. rate 16, height 1.676 m (5' 6\"), weight 94.3 kg (207 lb 14.4 oz), SpO2 93 %.  Wt Readings from Last 4 Encounters:   11/08/20 94.3 kg (207 lb 14.4 oz)   10/28/14 93.4 kg (206 lb)   10/15/14 95.3 kg (210 lb)   10/01/14 95.3 kg (210 lb)      I/O last 3 completed shifts:  In: 240 [P.O.:240]  Out: 200 [Urine:200]    Constitutional: Alert, no apparent distress,      Lungs: No crackles or wheezing,    Cardiovascular: Regular rate and rhythm, normal S1 and S2, and no murmur,    Lymphm node  Neck  ENT  Neurologic  Abdomen: No enlargement  No jugular vein extension or carotid bruit  No apparent abnormality  No focal deficit  Normal bowel sounds, soft, no distension, no tender   Skin: No rashes, no cyanosis   Extremity: No edema          Medications:     Current Facility-Administered Medications:      acetaminophen (TYLENOL) Suppository 650 mg, 650 mg, Rectal, Q4H PRN, Bria Roche DO     acetaminophen (TYLENOL) tablet 650 mg, 650 mg, Oral, Q4H PRN, Rocio Cardenas MD, 650 mg at 11/08/20 1507     amiodarone (PACERONE) tablet 400 mg, 400 mg, Oral, Daily, Corey Allen MD, 400 mg at 11/08/20 0814     amLODIPine " (NORVASC) tablet 5 mg, 5 mg, Oral, Daily, Bria Rochebeth, DO, 5 mg at 11/08/20 1230     aspirin EC tablet 81 mg, 81 mg, Oral, Daily, GreenBria Adelaide, DO, 81 mg at 11/08/20 0815     atorvastatin (LIPITOR) tablet 80 mg, 80 mg, Oral, QPM, Bria Roche Adelaide, DO, 80 mg at 11/08/20 2022     carboxymethylcellulose PF (REFRESH PLUS) 0.5 % ophthalmic solution 2 drop, 2 drop, Both Eyes, Daily, Bria Rochebeth, DO, 2 drop at 11/08/20 0815     carvedilol (COREG) tablet 25 mg, 25 mg, Oral, BID w/meals, GreenBria Adelaide, DO, 25 mg at 11/08/20 1719     ceFAZolin (ANCEF) intermittent infusion 2 g in 100 mL dextrose PRE-MIX, 2 g, Intravenous, Q8H, Bria Roche, DO, 2 g at 11/08/20 2304     cyanocobalamin (VITAMIN B-12) tablet 2,000 mcg, 2,000 mcg, Oral, Daily, Bria Rochebeth, DO, 2,000 mcg at 11/08/20 0814     HOLD:  Metformin and metformin containing medications if patient received IV contrast with acute kidney injury or severe chronic kidney disease (stage IV or stage V; i.e., eGFR less than 30), , Does not apply, HOLD, Rocio Cardenas MD     HOLD: enoxaparin (LOVENOX) Post Procedure, , Does not apply, Cecil HOWE Luciano Costa, MD     HOLD: heparin (IV or Subcutaneous) Post Procedure, , Does not apply, Cecil HOWE Luciano Costa, MD     HOLD: metformin and metformin containing medications on day of procedure and 48 hours after IV contrast given, , Does not apply, Cecil HOWE Luciano Costa, MD     ipratropium (ATROVENT) 0.06 % spray 2 spray, 2 spray, Both Nostrils, TID PRN, Bria Roche DO     latanoprost (XALATAN) 0.005 % ophthalmic solution 1 drop, 1 drop, Both Eyes, At Bedtime, Bria Roche DO, 1 drop at 11/08/20 2022     lidocaine (LMX4) cream, , Topical, Q1H PRN, Bria Roche DO     lidocaine 1 % 0.1-1 mL, 0.1-1 mL, Other, Q1H PRN, Bria Roche DO     LORazepam (ATIVAN) tablet 0.5 mg, 0.5  mg, Oral, At Bedtime PRN, Bria Roche DO, 0.5 mg at 11/08/20 2304     losartan (COZAAR) tablet 50 mg, 50 mg, Oral, Daily, Bria Roche DO, 50 mg at 11/08/20 0814     magnesium oxide (MAG-OX) tablet 400 mg, 400 mg, Oral, Once, Bria Roche DO     magnesium oxide (MAG-OX) tablet 400 mg, 400 mg, Oral, QPM, Bria Roche DO     melatonin tablet 1 mg, 1 mg, Oral, At Bedtime PRN, Bria Roche DO     midazolam (VERSED) injection 0.5-1 mg, 0.5-1 mg, Intravenous, Q5 Min PRN, Rocio Cardenas MD     mirtazapine (REMERON) tablet TABS 7.5 mg, 7.5 mg, Oral, At Bedtime, Bria Roche DO     naloxone (NARCAN) injection 0.1-0.4 mg, 0.1-0.4 mg, Intravenous, Q2 Min PRN, Bria Roche DO     nitroGLYcerin (NITROSTAT) sublingual tablet 0.4 mg, 0.4 mg, Sublingual, Q5 Min PRN, Bria Roche DO     omeprazole (priLOSEC) CR capsule 40 mg, 40 mg, Oral, QAM, Bria Roche DO, 40 mg at 11/08/20 0814     ondansetron (ZOFRAN-ODT) ODT tab 4 mg, 4 mg, Oral, Q6H PRN **OR** ondansetron (ZOFRAN) injection 4 mg, 4 mg, Intravenous, Q6H PRN, Bria Roche DO     oxyCODONE-acetaminophen (PERCOCET) 5-325 MG per tablet 1 tablet, 1 tablet, Oral, Q4H PRN, Amari Jacob MD, 1 tablet at 11/09/20 0606     Patient is already receiving anticoagulation with heparin, enoxaparin (LOVENOX), warfarin (COUMADIN)  or other anticoagulant medication, , Does not apply, Continuous PRN, Bria Roche DO     polyethylene glycol (MIRALAX) Packet 17 g, 17 g, Oral, Daily PRNMelchor Danielle Elizabeth, DO     senna-docusate (SENOKOT-S/PERICOLACE) 8.6-50 MG per tablet 1 tablet, 1 tablet, Oral, BID PRN **OR** senna-docusate (SENOKOT-S/PERICOLACE) 8.6-50 MG per tablet 2 tablet, 2 tablet, Oral, BID PRN, Bria Rcohe DO     sodium chloride (PF) 0.9% PF flush 3 mL, 3 mL, Intracatheter, q1 min prn, Green,  Bria Bryson DO     sodium chloride (PF) 0.9% PF flush 3 mL, 3 mL, Intracatheter, Q8H, Green, Bria Bryson DO, 3 mL at 11/08/20 2304     sodium chloride 0.9% infusion, , Intravenous, Continuous, MarchRocio MD, Last Rate: 75 mL/hr at 11/07/20 1006           Lab results:        Recent Labs   Lab Test 11/09/20  0603 11/08/20  0535 11/07/20  0535    141 138   POTASSIUM 3.9 3.7 3.8   CHLORIDE 108 110* 107   BOBBY 8.6 8.9 8.9   CO2 28 28 26   BUN 9 8 8   CR 0.76 0.70 0.83   * 116* 121*     Recent Labs   Lab Test 11/09/20  0603 11/08/20  0535 11/07/20  0535   HGB 12.0* 12.0* 12.1*   WBC 6.9 7.0 8.2    313 382     Recent Labs   Lab Test 11/08/20  0535 11/06/20  1240   INR 1.27* 1.75*     Recent Labs   Lab Test 11/06/20  2243 11/06/20  1728 11/06/20  1240   TROPI 1.329* 1.219* 0.034

## 2020-11-09 NOTE — TELEPHONE ENCOUNTER
Post ICD implant discharge phone call.    Reviewed the following:  - No raising arm above shoulder on the side of implant for 3 weeks  - Remove outer dressing 3-4 days after implant (Wednesday evening or Thursday). May shower after outer dressing removed.   - Leave steri-strips in place, will be removed at 1 week device check  - Limit driving for: 3 months (ICD - secondary prevention)  - Watch for redness, drainage, warmth, or fever. Call device clinic if any signs of infection.   - Plug in remote monitor within 10 feet of bedside.    1 week device check scheduled: Monday, November 16th at 10:00am.      Pt states he currently follows with a cardiologist at [facility].  He will decide this week if he would like to continue care there or transfer care to [facility].  If he decides to remain at [facility] he will notify the [facility].    Pt states understanding of all instructions and will call with any questions.  Instructions added to the AVS along with the device clinic phone number.     KIERA Alonso

## 2020-11-09 NOTE — DISCHARGE INSTRUCTIONS
Cardiac Angiogram Discharge Instructions - Radial    After you go home:      Have an adult stay with you until tomorrow.    Drink extra fluids for 2 days.    You may resume your normal diet.    No smoking       For 24 hours - due to the sedation you received:    Relax and take it easy.    Do NOT make any important or legal decisions.    Do NOT drive or operate machines at home or at work.    Do NOT drink alcohol.    Care of Wrist Puncture Site:      For the first 24 hrs - check the puncture site every 1-2 hours while awake.    It is normal to have soreness at the puncture site and mild tingling in your hand for up to 3 days.    Remove the bandaid after 24 hours. If there is minor oozing, apply another bandaid and remove it after 12 hours.    You may shower tomorrow.  Do NOT take a bath, or use a hot tub or pool for at least 3 days. Do NOT scrub the site. Do not use lotion or powder near the puncture site.           Activity:        For 2 days:     do not use your hand or arm to support your weight (such as rising from a chair)     do not bend your wrist (such as lifting a garage door).    do not lift more than 5 pounds or exercise your arm (such as tennis, golf or bowling).    Do NOT do any heavy activity such as exercise, lifting, or straining.     Bleeding:      If you start bleeding from the site in your wrist, sit down and press firmly on/above the site for 10 minutes.     Once bleeding stops, keep arm still for 2 hours.     Call [facility] as soon as you can.       Call 911 right away if you have heavy bleeding or bleeding that does not stop.      Medicines:      If you are taking an antiplatelet medication such as Plavix, Brilinta or Effient, do not stop taking it until you talk to your cardiologist.        If you are on Metformin (Glucophage), do not restart it until you have blood tests (within 2 to 3 days after discharge).  After you have your blood drawn, you may restart the Metformin.     Take your  medications, including blood thinners, unless your provider tells you not to.      If you take Coumadin (Warfarin), have your INR checked by your provider in  3-5 days. Call your clinic to schedule this.    If you have stopped any medicines, check with your provider about when to restart them.    Follow Up Appointments:      Follow up with [facility] Nurse Practitioner at [facility] of patient preference in 7-10 days.    Call the clinic if:      You have a large or growing hard lump around the site.    The site is red, swollen, hot or tender.    Blood or fluid is draining from the site.    You have chills or a fever greater than 101 F (38 C).    Your arm feels numb, cool or changes color.    You have hives, a rash or unusual itching.    Any questions or concerns.          [facility] [place]:    [phone] [facility] (7 days a week)          Discharge Instructions for Defibrillator Implantation  You have had a procedure to insert a defibrillator. Once inside your body, this small electronic device helps keep your heart from beating too slowly and will pace or shock you out of dangerous fast heart rhythms. A defibrillator can t fix existing heart problems. But it can help you feel better and have more energy. As you recover, follow all of the instructions you are given, including those below.  Activity    Follow the instructions you are given about limiting your activity.    Do not raise your arm on the incision side above shoulder level for 3 weeks. This gives the device lead wires time to attach securely inside your heart.    Ask your doctor when you can expect to return to work.    Limit driving for the next 3 months.    You can still exercise. It s good for your body and your heart. Talk with your doctor about an exercise plan.  Other Precautions    Follow your doctor's directions carefully for wound care. You may remove the outer dressing in 3 - 4 days (Wednesday night or  Thursday). Leave the steri-strips in place; these will be removed at your 1 week follow-up. Never put any creams, lotions, or products like peroxide on an incision unless your doctor tells you to.     You may shower once the outer dressing has been removed.     Before you receive any treatment, tell all health care providers (including your dentist) that you have a defibrillator.    You will be given an ID card that contains information about your defibrillator. Always carry this card with you. You can show this card if your defibrillator sets off a metal detector. You should also show it to avoid screening with a hand-held security wand.    Keep your cell phone away from your defibrillator. Don t carry the phone in your shirt pocket, even when it s turned off.    Avoid strong magnets. Examples are those used in MRIs or in hand-held security wands.    Avoid strong electrical fields. Examples are those made by radio transmitting towers,  ham  radios, and heavy-duty electrical equipment.    Avoid leaning over the open bryan of a running car. A running engine creates an electrical field. Most household and yard appliances will not cause any problems. If you use any large power tools, such as an industrial , talk with your doctor.   Follow-Up    Follow up in the device clinic as scheduled. You have an appointment scheduled for Monday, November 16th at 10:00am. Your appointment is at [facility] in [address], [address], [place] [address].     Make regular follow-up appointments with your device clinic. They will check the defibrillator to make sure it s working properly.  When to Call Your Device Clinic [phone]  Call your doctor immediately if you have any of the following:    Dizziness    Chest pain    Lack of energy    Fainting spells    Twitching chest muscles    Rapid pulse or pounding heartbeat    Shortness of breath    Pain around your defibrillator    Fever above  100.4 F (38 C) or other signs of infection (redness, swelling, drainage, or warmth at the incision site)    Hiccups that won t stop     [id number] [employer]. [address] [place] [address], [place] [address]. All rights reserved. This information is not intended as a substitute for professional medical care. Always follow your healthcare professional's instructions.    [facility] in [place]: [phone]  Device Clinic (Monday to Friday, 8am-4pm): [phone]  *The device clinic is closed on weekends and holidays.  Any calls received during this time will be answered on the next business  day. For any urgent questions after hours, please call the main clinic number and you will be put in contact with the cardiologist on call.

## 2020-11-09 NOTE — PROGRESS NOTES
Pt pain controlled on percocet last received at 6am, wife will transport reviewed driving restriction on narcotic. Will go home on previously used oxycodone, MD reviewed with pt.  vitals stable. IV removed w/o s/s of infection. Reviewed/gave d/c instructions including medications, limb restriction/site care and follow ups. Pt verbalized understanding. left radial site CDI, good distal pulse, no bruit or hematoma noted. PPM site dressing CDI, no noted bruising or hematoma. Leaving unit with all belongings. New RX med in hand at d/c. Transport provided by Spouse, leaving unit via wheel chair.

## 2020-11-09 NOTE — DISCHARGE SUMMARY
[facility]    Discharge Summary  Hospitalist    Date of Admission:  11/6/2020  Date of Discharge:  11/9/2020  Discharging Provider: Bria Roche DO  Date of Service (when I saw the patient): 11/09/20    Discharge Diagnoses    Monomorphic ventricular tachycardia, likely from inferior scar from past myocardial infarction  Cardiomyopathy, suspected stress induced (EF 40-45%)  Atrial fibrillation  Hypertension  Hx CAD, STEMI s/p PCI with ROGELIO to RCA 8/2014  Recent right knee replacement  Anemia, improving  Obstructive sleep apnea   GERD  Depression  Asymptomatic COVID-19 screening-negative    History of Present Illness   Candido Doe is an 70 year old male who presented with chest pressure, wide complex tachycardia seen on EKG, defibrillated back into sinus rhythm with improvement in rhythm seen with initiation of amiodarone. Cardiac cath was negative for lesion amenable to stenting and he underwent AICD placement on 11/8.    Hospital Course   Candido Doe was admitted on 11/6/2020.  The following problems were addressed during his hospitalization:    Monomorphic ventricular tachycardia, likely from inferior scar from past myocardial infarction s/p AICD placement 11/8  Cardiomyopathy, suspected stress induced (EF 40-45%)  Reported 1 hr onset of chest tightness/heaviness, called PCP who recommended EMS transport to hospital. Patient was noted to be hypotensive on home BP cuff (SBP 70s per wife). EMS gave 12mg adenosine without conversion, then cardioverted x3, after first shock was pulseless v-fib then after 2 more shocks eventual return to sinus rhythm with occasional vtach pulses. Given amiodarone bolus, then started on gtt with stabilization of rhythm to sinus. Trop 0.034--1.219---1.329 (elevation seen secondary to defibrillation on day of admission). Patient reports resolution of chest tightness. Strips reviewed show wide complex v tach, then sinus with occasional wide  complex bursts. EKG sinus.  *cardiac cath 11/7: no significant obstructive disease, small marginal branch old occlusion too small for intervention  *echocardiogram 11/7: EF 40-45%, LVSF mildly reduced. Severe hypokinesis in mid-apical segments of the lateral and inferolateral walls. Left atrium moderately dilated. Possible that this cardiomyopathy secondary to recent knee surgery stress.  *AICD placement 11/8, [employer], model D433, serial #[id number]. Did have episode of slower vtach (160s) after placement, with termination by antitachycardia pacing  - device check and CXR completed prior to discharge, no pneumothorax and showed good lead placement  - Initially on amiodarone gtt, switched to 400mg once daily for ventricular arrhthymia dosing, discharge on 400mg daily dosing, given 30 day supply  - Cardiology consult appreciated  - resume xarelto on 11/9 evening  - TSH and LFTs (noted to have elevated TSH, but T4 normal,  LFTs WNL). Recheck these levels with primary cardiologist as he may want to switch from amiodarone to alternative agent (like sotalol) due to risk amiodarone toxicity/adverse reaction  - Follow up with primary cardiologist, Dr Rizvi after discharge  - Okay to establish care with Dr Jacob (EP) if desired.  - Consider magnesium and potassium level check at next PCP visit.  - Still with a lot of pain from device placement, using oxycodone and tylenol as needed every 4 hours--he has supplies of both at home.     Atrial fibrillation  Hypertension  Hx CAD, STEMI s/p PCI with ROGELIO to RCA 8/2014  Echo 10/12/20: EF 50-55%, inferior wall and posterior wall abnormal, enlarged left atrium. Mitral valve sclerotic, trace mitral regurg, ascending aorta dilated to 3.9cm, aortic sinus 4cm.  PTA amlodipine 5mg daily, coreg 25mg BID, losartan 50mg daily, asa 81mg daily, xarelto 20mg daily. lipitor 80mg daily  - resume xarelto on 11/9 PM  - Resumed PTA low dose amlodipine and BPs were stable  - Continue PTA  coreg, losartan, ASA, lipitor on discharge     Recent right knee replacement  ~2 weeks prior. Following with outpatient therapies, last seen on 11/5  - Follow up with outpatient PT and continue previous recs, ordered new outpatient order just in case lost due to hospitalization     Anemia, improving  Presume secondary to recent blood loss anemia with right knee replacement. Hgb 11.6 on admit, improved to 12 by time of discharge     Obstructive sleep apnea   Has not been using his CPAP in the past 2 weeks at home and requested not to have it while in the hospital.  - Follow up with PCP     GERD  PTA omeprazole 40mg daily continued     Depression  PTA remeron at bedtime, continued     Asymptomatic COVID-19 screening-negative  No symptoms. Screened as asymptomatic, urgent for procedure    Bria Roche, DO    Significant Results and Procedures   11/7 Cardiac Cath: Stable non-obstructive CAD with no acute lesions noted as cause for VT presentation  Cardiomyopathy with inferior hypokinesis and EF estimated around 35-40%. Last echo from [facility] 10/2020 reported EF 50-55%    11/8: Placement of AICD left chest wall    Pending Results   NA    Code Status   Full Code       Primary Care Physician   Alex Moore    Physical Exam   Temp: 98.3  F (36.8  C)   BP: 132/88 Pulse: 63   Resp: 20 SpO2: 97 % O2 Device: None (Room air)    Vitals:    11/06/20 1230 11/07/20 0647 11/08/20 0629   Weight: 96.7 kg (213 lb 3 oz) 95.9 kg (211 lb 8 oz) 94.3 kg (207 lb 14.4 oz)     Vital Signs with Ranges  Temp:  [98.3  F (36.8  C)] 98.3  F (36.8  C)  Pulse:  [59-63] 63  Resp:  [16-20] 20  BP: (103-132)/(69-88) 132/88  SpO2:  [93 %-97 %] 97 %  I/O last 3 completed shifts:  In: 240 [P.O.:240]  Out: 200 [Urine:200]    Patient seen and examined on day of discharge. Feels okay. Eager to leave. Still having pain requiring oxycodone and tylenol every 4-8 hours while in the hospital. Has supply of oxycodone at home and he can use this as needed.  Understands limits with activity, all questions answered by previous staff (cardiology, device RN). Stable for discharge to home.    Constitutional: Awake, alert, cooperative, no apparent distress.  Eyes: Conjunctiva and pupils examined and normal.  HEENT: Moist mucous membranes, normal dentition.  Respiratory: Clear to auscultation bilaterally, no crackles or wheezing.  Cardiovascular: Regular rate and rhythm, normal S1 and S2, and no murmur noted. Left chest wall with device in place with dressing covering, no hematoma seen  GI: Soft, non-distended, non-tender, normal bowel sounds.  Lymph/Hematologic: No anterior cervical or supraclavicular adenopathy.  Skin: No rashes, no cyanosis, no edema.  Musculoskeletal: No joint swelling, erythema or tenderness.  Neurologic: Cranial nerves 2-12 intact, normal strength and sensation.  Psychiatric: Alert, oriented to person, place and time, no obvious anxiety or depression.    Discharge Disposition   Discharged to home, resume outpatient PT  Condition at discharge: Stable    Consultations This Hospital Stay   CARDIOLOGY IP CONSULT  PHYSICAL THERAPY ADULT IP CONSULT  PHARMACY TO DOSE HEPARIN  PHARMACY IP CONSULT  PHARMACY IP CONSULT  PHARMACY IP CONSULT  PHARMACY IP CONSULT  SMOKING CESSATION PROGRAM IP CONSULT    Time Spent on this Encounter   IBria DO, personally saw the patient today and spent greater than 30 minutes discharging this patient.    Discharge Orders      Physical Therapy Referral      Discharge Order: F/U with Cardiac  DEREK      Reason for your hospital stay    Wide complex tachycardia (fast heart rate) requiring shock and start of amiodarone to place back in regular rhythm. You underwent work-up for heart disease (Cardiac catheterization--no lesions needing stents) and defibrillator placement to keep heart in rhythm.     Activity    Your activity upon discharge: activity as tolerated, limitations as previously outlined regarding your left arm      Follow-up and recommended labs and tests     Follow up with primary care provider, Ching Guerrero, within 7 days for hospital follow- up and to discuss medication change.        No follow up labs or test are needed at that visit, but could consider potassium and magnesium levels.    Follow up with cardiac nurse on 11/16 for device check as previously scheduled. (SEE DETAILS BELOW)    Follow up with primary cardiologist, Dr Rizvi in 2-4 weeks or sooner if wanted.     **Repeat thyroid and liver function tests at that appointment to determine safety of amiodarone.    [facility] [place] Office  [phone] [address], [address]  [place] [address]    **Clinic will contact you to schedule follow up with Nurse Practitioner.  If you have not heard from them in 48 hrs please contact them to schedule.     Discharge Instructions    Take your previous medications as you are. You will resume your xarelto tonight (11/9).    Your only new medication is amiodarone, which you will take 400mg once daily. Follow up with your primary cardiologist at [facility] to determine if you can be switched to alternative agent.    For pain, continue to take home oxycodone 5mg and 500mg tylenol as needed every 4 hours for pain. Eventually your pain will improve and you will be able to wean yourself off of medications. Continue to take stool softeners while you are taking regular oxycodone.     Full Code     Diet    Follow this diet upon discharge: Low saturated fat diet     Discharge Medications   Discharge Medication List as of 11/9/2020 10:18 AM      START taking these medications    Details   amiodarone (PACERONE) 400 MG tablet Take 1 tablet (400 mg) by mouth daily, Disp-30 tablet, R-0, E-Prescribe         CONTINUE these medications which have NOT CHANGED    Details   acetaminophen (TYLENOL) 500 MG tablet Take 500-1,000 mg by mouth 3 times daily as needed for mild pain, Historical      amLODIPine (NORVASC) 5 MG  tablet Take 5 mg by mouth, Historical      aspirin 81 MG EC tablet Take 81 mg by mouth daily, Historical      atorvastatin (LIPITOR) 80 MG tablet Take 80 mg by mouth, Historical      carboxymethylcellulose PF (REFRESH PLUS) 0.5 % ophthalmic solution Place 2 drops into both eyes daily, Historical      carvedilol (COREG) 25 MG tablet Take 25 mg by mouth, Historical      cholecalciferol 50 MCG (2000 UT) tablet Take 1 tablet by mouth, Historical      co-enzyme Q-10 100 MG CAPS capsule Take 100 mg by mouth At Bedtime, Historical      cyanocobalamin (VITAMIN B-12) 1000 MCG tablet Take 2,000 mcg by mouth daily, Historical      fish oil-omega-3 fatty acids 1000 MG capsule Take 2 g by mouth daily , Historical      ipratropium (ATROVENT) 0.06 % nasal spray Spray 2 sprays into both nostrils 3 times daily as needed for rhinitis, Historical      latanoprost (XALATAN) 0.005 % ophthalmic solution Place 1 drop into both eyes At Bedtime, Historical      LORazepam (ATIVAN) 1 MG tablet Take 1 to 2 tablets by mouth at bedtime as needed., Historical      losartan (COZAAR) 50 MG tablet Take 50 mg by mouth, Historical      magnesium oxide (MAG-OX) 400 (241.3 Mg) MG tablet Take 400 mg by mouth, Historical      mirtazapine (REMERON) 7.5 MG tablet Take 7.5 mg by mouth, Historical      multivitamin, therapeutic (THERA-VIT) TABS tablet Take 1 tablet by mouth daily, Historical      nitroGLYcerin (NITROSTAT) 0.4 MG sublingual tablet Place 0.4 mg under the tongue, Historical      NONFORMULARY Take 1 capsule by mouth daily [employer] immune supplement, Historical      omeprazole (PRILOSEC) 40 MG DR capsule Take 40 mg by mouth, Historical      oxyCODONE (ROXICODONE) 5 MG tablet Take 5 mg by mouth 3 times daily as needed for severe pain, Historical      rivaroxaban ANTICOAGULANT (XARELTO) 20 MG TABS tablet Take 20 mg by mouth, Historical      vitamin C (ASCORBIC ACID) 1000 MG TABS Take 2,000 mg by mouth daily, Historical           Allergies    Allergies   Allergen Reactions     No Clinical Screening - See Comments Angioedema     Ate Escargot     Slug Extract  [Snail Extract] Angioedema     Data   Most Recent 3 CBC's:  Recent Labs   Lab Test 11/09/20  0603 11/08/20  0535 11/07/20  0535   WBC 6.9 7.0 8.2   HGB 12.0* 12.0* 12.1*   MCV 98 97 98    313 382      Most Recent 3 BMP's:  Recent Labs   Lab Test 11/09/20  0603 11/08/20  0535 11/07/20  0535    141 138   POTASSIUM 3.9 3.7 3.8   CHLORIDE 108 110* 107   CO2 28 28 26   BUN 9 8 8   CR 0.76 0.70 0.83   ANIONGAP 5 3 5   BOBBY 8.6 8.9 8.9   * 116* 121*     Most Recent 2 LFT's:  Recent Labs   Lab Test 11/09/20  0603   AST 34   ALT 16   ALKPHOS 99   BILITOTAL 0.6     Most Recent INR's and Anticoagulation Dosing History:  Anticoagulation Dose History     Recent Dosing and Labs Latest Ref Rng & Units 11/6/2020 11/8/2020    INR 0.86 - 1.14 1.75(H) 1.27(H)        Most Recent 3 Troponin's:  Recent Labs   Lab Test 11/06/20  2243 11/06/20  1728 11/06/20  1240   TROPI 1.329* 1.219* 0.034     Most Recent Cholesterol Panel:No lab results found.  Most Recent 6 Bacteria Isolates From Any Culture (See EPIC Reports for Culture Details):No lab results found.  Most Recent TSH, T4 and A1c Labs:  Recent Labs   Lab Test 11/09/20  0603   TSH 6.51*   T4 1.17     Results for orders placed or performed during the hospital encounter of 11/06/20   EP Device    Narrative    PROCEDURES PERFORMED:  1. Conscious sedation.  2. Cardiac fluoroscopy.  3. Implantation of a dual-chamber defibrillator.    INDICATION: Ischemic cardiomyopathy and sustained ventricular tachycardia.    HPI: 70-year old gentleman with a history of hypertension, hyperlipidemia,   obstructive sleep apnea, paroxysmal atrial fibrillation and ischemic   cardiomyopathy (previous MI and PCI to RCA/PDA), who was admitted with   sustained monomorphic ventricular tachycardia (VT).  He was referred for   ICD implantation for secondary prevention.           Risks and benefits of the procedure were reviewed including but not   limited to arrhythmia, pain, infection, bleeding, skin damage from ionized   radiation, kidney damage or allergic reactions to conscious dye, injury to   the neighboring vascular structures, need for emergent cardiopulmonary   resuscitation, heart attack, stroke or death. Alternatives were discussed   including doing nothing. All questions were answered to the patient's   satisfaction. Informed consent was obtained and patient wished to proceed.    Guidelines ICD Indication:    CLASS I  1. ICD therapy is indicated in patients who are survivors of cardiac   arrest due to VF or hemodynamically unstable sustained VT after evaluation   to define the cause of the event and to exclude any completely reversible   causes. (Level of Evidence: A)    Based on Device Guidelines (2012 Update). [facility] Vol. 61, No. 3, January 22, 2013:e6 -75.    FLUOROSCOPY DATA:  Fluoro time:  1 minute and 8 seconds.  Radiation dose (AK): 4 mGy.  Dose-area product (DAP):  201 Gy. Cm2.    DESCRIPTION OF PROCEDURE: After written informed consent was obtained,   patient was brought to the EP lab. An intravenous infusion of prophylactic   antibiotic was begun. The chest was sterilely prepped from the level of   iliac crest to level of the chin with antibiotic soap and disinfectant,   draped appropriately. Following infiltration with 1% lidocaine, an   incision was made parallel to and 1 cm beneath the clavicle and extended   across the deltopectoral groove. Using blunt dissection, the excision was   extended down the anterior pectoral fascia. Using blunt and sharp   dissection, a pocket was developed parallel to the fascia and extended   inferiorly.    Two pocket punctures via fluoroscopy guidance and modified Seldinger's   technique were used to attain access into the left subclavian vein. A 9   Japanese sheath was inserted over the wire. The right ventricular   defibrillator lead  was advanced under fluoroscopy and a secure location   found. The lead was fixated.  Stimulation and sensing thresholds were   found to be satisfactory. No diaphragmatic stimulation was noticed with   high output pacing. The lead was sutured to the underlying pectoral muscle   with interrupted 0 silk suture over a plastic collar.   A 7 Y[place]ut sheath   was inserted over the wire. The right atrial lead was advanced under   fluoroscopy and a secure location found. The lead was fixated. Stimulation   and sensing thresholds were found to be satisfactory. No diaphragmatic   stimulation was noticed with high output pacing. The lead was sutured to   the underlying pectoral muscle with interrupted 0 silk suture over a   plastic collar.    After irrigation with saline solution, the pocket was inspected and no   bleeding was seen.  The generator was connected to the leads and found to   perform appropriately. The generator was inserted into the pocket, and the   wound was closed with subcutaneous sutures. A pressure dressing was   applied.    Of note, patient developed monomorphic ventricular tachycardia (VT)   spontaneously  after device implantation.  Ventricular tachycardia had the   same morphology as the initial VT, however heart rate was slower (160   bpm).  Several attempts of ATP were performed. Tachycardia was   successfully terminated by ATP at 78% of cycle length for 12 cycles.     DEFIBRILLATOR GENERATOR: [employer], model D433, serial #[id number].    LEAD INFORMATION:  Right atrial lead: [employer], model 7840, serial #[id number].  Right ventricular lead: [employer], model 0272, serial #[id number].     MEASURED DATA:  Right atrial lead: Sensing 3.7 mV, threshold 0.8 volt at 0.4 msec,   impedance 489 ohms.  Right ventricular lead: Sensing 11.8 mV, threshold 1.0 volt at 0.4 msec,   impedance 882 ohms, shock impedance 45 ohms.    IMPRESSION:  1. Successful dual chamber ICD implantation.  2. Tachy therapies  were programmed to 3 zones: VT-1> 140 bpm (monitor zone   only), VT-2> 160 bpm (ATP x2 and 41J x6) and VF> 240 bpm (ATP and 41J x8).  3. Bradycardia pacing set to DDD 60 to 130 bpm.    RECOMMENDATIONS:  1. Avoid vascular access to the left jugular and subclavian veins.  2. Keep wound clean, dry and covered for seven days.  3. PA and lateral chest x-ray to rule out dislodgement.  4. Device interrogation prior to discharge.  5. Left arm in sling overnight and then off in the morning.  6. May start oral medications. Avoid IV or subcutaneous heparin for at   least two weeks. If heparin must be used, please contract the procedural   team to discuss.  7. Wound care as follows:  a) Do not get incision wet for three days.  b) Leave the Steri-Strips in place, allow to come off naturally. If they   do not come off in two weeks, you may remove them.  c) Check incision daily and call if they notice one of the following:   redness, drainage (pus or blood), swelling, warmth or if you develop   fever.    If you have questions regarding wound care, please contact our office.    Amari Jacob MD              Echocardiogram Limited    Narrative    [id number]  [id number]  [id number]  [id number]^[email or web address]^TOBIAS           [facility]  Echocardiography Laboratory  [address] [place] [address]        Name: KENYA MOSQUEDA  MRN: [id number]  : 1950  Study Date: 2020 11:14 AM  Age: 70 yrs  Gender: Male  Patient Location: [facility]  Reason For Study: VT  Ordering Physician: MINH WEBER  Referring Physician: MALICK VAUGHN  Performed By: Mona Huff     BSA: 2.0 m2  Height: 66 in  Weight: 211 lb  HR: 56  BP: 131/76 mmHg  _____________________________________________________________________________  __        Procedure  Limited Portable Echo Adult.  _____________________________________________________________________________  __        Interpretation Summary     1. Left ventricular systolic function  is mildly reduced. The ejection fraction  is estimated at 40-45%. There is severe hypokinesis of the mid-apical segments  of the lateral and inferolateral walls.  2. The right ventricle is normal in size and function.  3. The left atrium is at least moderately dilated. Right atrial size is  normal.  4. No hemodynamically significant valvular disease.  No prior study.  _____________________________________________________________________________  __        Left Ventricle  The left ventricle is mildly dilated. There is mild eccentric left ventricular  hypertrophy. Left ventricular systolic function is mildly reduced. The visual  ejection fraction is estimated at 40-45%. There is severe hypokinesis of the  mid-apical segments of the lateral and inferolateral walls.     Right Ventricle  The right ventricle is normal in size and function.     Atria  The left atrium is moderately dilated. Right atrial size is normal.     Mitral Valve  The mitral valve leaflets appear thickened, but open well. There is mild (1+)  mitral regurgitation.        Tricuspid Valve  Normal tricuspid valve. There is trace tricuspid regurgitation. Right  ventricular systolic pressure could not be approximated due to inadequate  tricuspid regurgitation.     Aortic Valve  The aortic valve is not well visualized.     Pulmonic Valve  The pulmonic valve is not well visualized.     Vessels  Normal size aorta. IVC diameter <2.1 cm collapsing >50% with sniff suggests a  normal RA pressure of 3 mmHg.     _____________________________________________________________________________  __  MMode/2D Measurements & Calculations  IVSd: 1.2 cm  LVIDd: 6.1 cm  LVIDs: 4.4 cm  LVPWd: 0.91 cm  FS: 27.4 %  LV mass(C)d: 263.9 grams  LV mass(C)dI: 129.0 grams/m2  RWT: 0.30                    _____________________________________________________________________________  __        Report approved by: Patricia Tejada 11/07/2020 12:01 PM      Cardiac Catheterization     Narrative    1) Stable non-obstructive CAD with no acute lesions noted as cause for VT   presentation  2) Cardiomyopathy with inferior hypokinesis and EF estimated around   35-40%; will need formal echo. Last echo from [facility] 10/2020 reported EF   50-55%

## 2020-11-09 NOTE — PROGRESS NOTES
11/09/20 0900   Quick Adds   Type of Visit Initial PT Evaluation   Living Environment   People in home spouse   Current Living Arrangements house   Home Accessibility stairs to enter home;stairs within home   Number of Stairs, Main Entrance 3   Stair Railings, Main Entrance railings safe and in good condition;railing on left side (ascending)   Number of Stairs, Within Home, Primary 10   Stair Railings, Within Home, Primary railing on left side (ascending);railings safe and in good condition   Transportation Anticipated family or friend will provide   Self-Care   Usual Activity Tolerance good   Current Activity Tolerance good   Regular Exercise Yes   Activity/Exercise Type other (see comments)  (Outpatient PT 2x/week)   Exercise Amount/Frequency 2 times/wk   Equipment Currently Used at Home cane, straight   Disability/Function   Fall history within last six months no   Number of times patient has fallen within last six months 0   General Information   Onset of Illness/Injury or Date of Surgery 11/06/20   Referring Physician Dr. Roche   Patient/Family Therapy Goals Statement (PT) To go home   Pertinent History of Current Problem (include personal factors and/or comorbidities that impact the POC) Pt is a 70 year old male with recent history of right TKA admitted for pacemaker placement   Cognition   Orientation Status (Cognition) oriented x 4   Pain Assessment   Patient Currently in Pain No   Range of Motion (ROM)   ROM Comment Right knee limited 100 degrees flex, all other ROM WFL   Strength   Strength Comments Right knee 4+/5, all other strength WFL   Bed Mobility   Comment (Bed Mobility) Independent   Transfers   Transfer Safety Comments Independent   Gait/Stairs (Locomotion)   Comment (Gait/Stairs) 50' no AD independent   Balance   Balance Comments Good   Clinical Impression   Criteria for Skilled Therapeutic Intervention yes, treatment indicated   PT Diagnosis (PT) Impaired endurance   Influenced by the  following impairments Decreased endurance   Functional limitations due to impairments Difficulty with long distance ambulation   Clinical Presentation Stable/Uncomplicated   Clinical Presentation Rationale VSS, pain controlled   Clinical Decision Making (Complexity) low complexity   Therapy Frequency (PT) One time eval and treatment only   Predicted Duration of Therapy Intervention (days/wks) 1 days   Risk & Benefits of therapy have been explained evaluation/treatment results reviewed;care plan/treatment goals reviewed;risks/benefits reviewed;current/potential barriers reviewed;participants voiced agreement with care plan;participants included;patient;spouse/significant other   PT Discharge Planning    PT Discharge Recommendation (DC Rec) home with outpatient physical therapy   PT Rationale for DC Rec Pt is independent with mobility and safe to discharge home. Recommend resumption of outpatient PT for right TKA.   PT Brief overview of current status  Independent   Total Evaluation Time   Total Evaluation Time (Minutes) 8

## 2020-11-10 ENCOUNTER — TELEPHONE (OUTPATIENT)
Dept: CARDIOLOGY | Facility: CLINIC | Age: 70
End: 2020-11-10

## 2020-11-10 LAB — INTERPRETATION ECG - MUSE: NORMAL

## 2020-11-10 NOTE — TELEPHONE ENCOUNTER
Patient recovering at home s/o right TKA done 2 weeks ago, when developed chest pain. EMS found pt in [facility]-MR-shock x 2-ROSC. PMH: CAD with MI in 2014 and cardiac arrest, ROGELIO to RCA and PDA LAD, CM that had improved from 30% to 50% ([facility] 10/2020). EF now 40%-possible stress induced CM secondary to recent knee surgery and ongoing pain. 11/7/20: Coronary angiogram via LRA showed stable, nonobstructive CAD. Pt was started on Amiodarone, as continued to have episodes of NSVT. 11/8/20: ICD implantation. 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 Continued on Amiodarone at time of discharge and pt prefers to follow up with his PTA I cardiology team. Called patient to discuss any post hospital d/c questions he may have, review medication changes, and confirm f/u appts. Patient denied any questions regarding new medications or changes with their PTA medications. Patient denied any SOB, chest pain, or light headedness. LRA cardiac cath site and ICD site are without bleeding, swelling, redness or tenderness. Denies fever. Does c/o soreness to left elbow, without N/T, swelling or pallor to left lower arm. States has been using heat for comfort. RN confirmed with patient that he needs to follow up at his I cardiology office-states they have been in contact with him today. Patient advised to call clinic with any cardiac related questions or concerns prior to this tammy't. Patient verbalized understanding and agreed with plan. ALECIA Mckenzie RN.

## 2020-11-16 ENCOUNTER — ANCILLARY PROCEDURE (OUTPATIENT)
Dept: CARDIOLOGY | Facility: CLINIC | Age: 70
End: 2020-11-16
Attending: INTERNAL MEDICINE
Payer: MEDICARE

## 2020-11-16 DIAGNOSIS — Z95.810 ICD (IMPLANTABLE CARDIOVERTER-DEFIBRILLATOR) IN PLACE: ICD-10-CM

## 2020-11-16 DIAGNOSIS — I42.9 CARDIOMYOPATHY (H): ICD-10-CM

## 2020-11-16 DIAGNOSIS — I49.9 VENTRICULAR ARRHYTHMIA: ICD-10-CM

## 2020-11-16 PROCEDURE — 93283 PRGRMG EVAL IMPLANTABLE DFB: CPT | Performed by: INTERNAL MEDICINE

## 2020-11-16 NOTE — PROGRESS NOTES
[employer] Res[employer] CRT-D 7 Day Post ICD Device Check  Patient seen in clinic for device evaluation and iterative programming.   AP: 74 %    : 2 %    Mode: DDD    Underlying Rhythm: Stable with minimal variability from 60    Heart Rate: SB 50s    Sensing: WNL     Pacing Threshold: WNL    Impedance: WNL    Battery Status: 13 years     Incision/Complications: Healing well. No hematoma or signs of infection. He does have some yellow bruising on the left chest. Steri strips removed today.     Atrial Arrhythmia: 7 mode switch episodes logged. EGMs show Afib with controlled ventricular rates. Longest episode lasted 2.3 hours. AT/AF burden is 2%. On xarelto.   Ventricular Arrhythmia: 8 NSVT episodes logged. EGMs show NSVT lasting 10-12 beats with rates in the 170s.   On amio and coreg.   ATP: 0    Shocks: 0    Setting Change: Rate response on turned on at the nominal settings.     Care Plan: Patient will return to [facility] where he received care prior to implant.  LYNNE, RN       I have reviewed and interpreted the device interrogation, settings, programming and nurse's summary. The device is functioning within normal device parameters. I agree with the current findings, assessment and plan.

## 2020-11-17 LAB
MDC_IDC_EPISODE_DTM: NORMAL
MDC_IDC_EPISODE_ID: NORMAL
MDC_IDC_EPISODE_TYPE: NORMAL
MDC_IDC_LEAD_IMPLANT_DT: NORMAL
MDC_IDC_LEAD_IMPLANT_DT: NORMAL
MDC_IDC_LEAD_LOCATION: NORMAL
MDC_IDC_LEAD_LOCATION: NORMAL
MDC_IDC_LEAD_LOCATION_DETAIL_1: NORMAL
MDC_IDC_LEAD_LOCATION_DETAIL_1: NORMAL
MDC_IDC_LEAD_MFG: NORMAL
MDC_IDC_LEAD_MFG: NORMAL
MDC_IDC_LEAD_MODEL: NORMAL
MDC_IDC_LEAD_MODEL: NORMAL
MDC_IDC_LEAD_POLARITY_TYPE: NORMAL
MDC_IDC_LEAD_POLARITY_TYPE: NORMAL
MDC_IDC_LEAD_SERIAL: NORMAL
MDC_IDC_LEAD_SERIAL: NORMAL
MDC_IDC_MSMT_BATTERY_STATUS: NORMAL
MDC_IDC_MSMT_CAP_CHARGE_ENERGY: 0 J
MDC_IDC_MSMT_CAP_CHARGE_TIME: 0 S
MDC_IDC_MSMT_CAP_CHARGE_TIME: 0 S
MDC_IDC_MSMT_CAP_CHARGE_TYPE: NORMAL
MDC_IDC_MSMT_CAP_CHARGE_TYPE: NORMAL
MDC_IDC_MSMT_LEADCHNL_RA_IMPEDANCE_VALUE: 647 OHM
MDC_IDC_MSMT_LEADCHNL_RA_PACING_THRESHOLD_AMPLITUDE: 0.8 V
MDC_IDC_MSMT_LEADCHNL_RA_PACING_THRESHOLD_PULSEWIDTH: 0.4 MS
MDC_IDC_MSMT_LEADCHNL_RA_SENSING_INTR_AMPL: 4.1 MV
MDC_IDC_MSMT_LEADCHNL_RV_IMPEDANCE_VALUE: 494 OHM
MDC_IDC_MSMT_LEADCHNL_RV_PACING_THRESHOLD_AMPLITUDE: 1 V
MDC_IDC_MSMT_LEADCHNL_RV_PACING_THRESHOLD_PULSEWIDTH: 0.4 MS
MDC_IDC_MSMT_LEADCHNL_RV_SENSING_INTR_AMPL: 12 MV
MDC_IDC_PG_IMPLANT_DTM: NORMAL
MDC_IDC_PG_MFG: NORMAL
MDC_IDC_PG_MODEL: NORMAL
MDC_IDC_PG_SERIAL: NORMAL
MDC_IDC_PG_TYPE: NORMAL
MDC_IDC_SESS_CLINIC_NAME: NORMAL
MDC_IDC_SESS_DTM: NORMAL
MDC_IDC_SESS_TYPE: NORMAL
MDC_IDC_SET_BRADY_AT_MODE_SWITCH_MODE: NORMAL
MDC_IDC_SET_BRADY_AT_MODE_SWITCH_RATE: 170 {BEATS}/MIN
MDC_IDC_SET_BRADY_LOWRATE: 60 {BEATS}/MIN
MDC_IDC_SET_BRADY_MAX_SENSOR_RATE: 130 {BEATS}/MIN
MDC_IDC_SET_BRADY_MAX_TRACKING_RATE: 130 {BEATS}/MIN
MDC_IDC_SET_BRADY_MODE: NORMAL
MDC_IDC_SET_BRADY_PAV_DELAY_HIGH: 150 MS
MDC_IDC_SET_BRADY_PAV_DELAY_LOW: 200 MS
MDC_IDC_SET_BRADY_SAV_DELAY_HIGH: 150 MS
MDC_IDC_SET_BRADY_SAV_DELAY_LOW: 200 MS
MDC_IDC_SET_LEADCHNL_RA_PACING_AMPLITUDE: 2 V
MDC_IDC_SET_LEADCHNL_RA_PACING_CAPTURE_MODE: NORMAL
MDC_IDC_SET_LEADCHNL_RA_PACING_POLARITY: NORMAL
MDC_IDC_SET_LEADCHNL_RA_PACING_PULSEWIDTH: 0.4 MS
MDC_IDC_SET_LEADCHNL_RA_SENSING_ADAPTATION_MODE: NORMAL
MDC_IDC_SET_LEADCHNL_RA_SENSING_POLARITY: NORMAL
MDC_IDC_SET_LEADCHNL_RA_SENSING_SENSITIVITY: 0.25 MV
MDC_IDC_SET_LEADCHNL_RV_PACING_AMPLITUDE: 2 V
MDC_IDC_SET_LEADCHNL_RV_PACING_CAPTURE_MODE: NORMAL
MDC_IDC_SET_LEADCHNL_RV_PACING_POLARITY: NORMAL
MDC_IDC_SET_LEADCHNL_RV_PACING_PULSEWIDTH: 0.4 MS
MDC_IDC_SET_LEADCHNL_RV_SENSING_ADAPTATION_MODE: NORMAL
MDC_IDC_SET_LEADCHNL_RV_SENSING_POLARITY: NORMAL
MDC_IDC_SET_LEADCHNL_RV_SENSING_SENSITIVITY: 0.6 MV
MDC_IDC_SET_ZONE_DETECTION_INTERVAL: 250 MS
MDC_IDC_SET_ZONE_DETECTION_INTERVAL: 375 MS
MDC_IDC_SET_ZONE_DETECTION_INTERVAL: 429 MS
MDC_IDC_SET_ZONE_TYPE: NORMAL
MDC_IDC_SET_ZONE_VENDOR_TYPE: NORMAL
MDC_IDC_STAT_EPISODE_RECENT_COUNT: 0
MDC_IDC_STAT_EPISODE_RECENT_COUNT: 0
MDC_IDC_STAT_EPISODE_RECENT_COUNT: 8
MDC_IDC_STAT_EPISODE_RECENT_COUNT_DTM_END: NORMAL
MDC_IDC_STAT_EPISODE_RECENT_COUNT_DTM_START: NORMAL
MDC_IDC_STAT_EPISODE_TOTAL_COUNT: 0
MDC_IDC_STAT_EPISODE_TOTAL_COUNT: 0
MDC_IDC_STAT_EPISODE_TOTAL_COUNT: 8
MDC_IDC_STAT_EPISODE_TOTAL_COUNT_DTM_END: NORMAL
MDC_IDC_STAT_EPISODE_TYPE: NORMAL
MDC_IDC_STAT_EPISODE_VENDOR_TYPE: NORMAL
MDC_IDC_STAT_TACHYTHERAPY_ATP_DELIVERED_RECENT: 0
MDC_IDC_STAT_TACHYTHERAPY_ATP_DELIVERED_TOTAL: 9
MDC_IDC_STAT_TACHYTHERAPY_RECENT_DTM_END: NORMAL
MDC_IDC_STAT_TACHYTHERAPY_RECENT_DTM_START: NORMAL
MDC_IDC_STAT_TACHYTHERAPY_SHOCKS_ABORTED_RECENT: 0
MDC_IDC_STAT_TACHYTHERAPY_SHOCKS_ABORTED_TOTAL: 0
MDC_IDC_STAT_TACHYTHERAPY_SHOCKS_DELIVERED_RECENT: 0
MDC_IDC_STAT_TACHYTHERAPY_SHOCKS_DELIVERED_TOTAL: 0
MDC_IDC_STAT_TACHYTHERAPY_TOTAL_DTM_END: NORMAL

## 2021-01-15 ENCOUNTER — HEALTH MAINTENANCE LETTER (OUTPATIENT)
Age: 71
End: 2021-01-15

## 2021-09-04 ENCOUNTER — HEALTH MAINTENANCE LETTER (OUTPATIENT)
Age: 71
End: 2021-09-04

## 2022-02-19 ENCOUNTER — HEALTH MAINTENANCE LETTER (OUTPATIENT)
Age: 72
End: 2022-02-19

## 2022-10-22 ENCOUNTER — HEALTH MAINTENANCE LETTER (OUTPATIENT)
Age: 72
End: 2022-10-22

## 2024-11-22 ENCOUNTER — APPOINTMENT (OUTPATIENT)
Dept: GENERAL RADIOLOGY | Facility: CLINIC | Age: 74
End: 2024-11-22
Attending: EMERGENCY MEDICINE
Payer: MEDICARE

## 2024-11-22 ENCOUNTER — HOSPITAL ENCOUNTER (OUTPATIENT)
Facility: CLINIC | Age: 74
Setting detail: OBSERVATION
Discharge: HOME | End: 2024-11-23
Attending: EMERGENCY MEDICINE | Admitting: HOSPITALIST
Payer: MEDICARE

## 2024-11-22 DIAGNOSIS — R07.9 CHEST PAIN, UNSPECIFIED TYPE: Primary | ICD-10-CM

## 2024-11-22 LAB
ANION GAP SERPL CALCULATED.3IONS-SCNC: 10 MMOL/L (ref 7–15)
BASOPHILS # BLD AUTO: 0 10E3/UL (ref 0–0.2)
BASOPHILS NFR BLD AUTO: 0 %
BUN SERPL-MCNC: 16.1 MG/DL (ref 8–23)
CALCIUM SERPL-MCNC: 9.1 MG/DL (ref 8.8–10.4)
CHLORIDE SERPL-SCNC: 103 MMOL/L (ref 98–107)
CREAT SERPL-MCNC: 1 MG/DL (ref 0.67–1.17)
EGFRCR SERPLBLD CKD-EPI 2021: 78 ML/MIN/1.73M2
EOSINOPHIL # BLD AUTO: 0.1 10E3/UL (ref 0–0.7)
EOSINOPHIL NFR BLD AUTO: 2 %
ERYTHROCYTE [DISTWIDTH] IN BLOOD BY AUTOMATED COUNT: 12.8 % (ref 10–15)
GLUCOSE SERPL-MCNC: 118 MG/DL (ref 70–99)
HCO3 SERPL-SCNC: 28 MMOL/L (ref 22–29)
HCT VFR BLD AUTO: 43.7 % (ref 40–53)
HGB BLD-MCNC: 14.4 G/DL (ref 13.3–17.7)
HOLD SPECIMEN: NORMAL
HOLD SPECIMEN: NORMAL
IMM GRANULOCYTES # BLD: 0 10E3/UL
IMM GRANULOCYTES NFR BLD: 0 %
LYMPHOCYTES # BLD AUTO: 1.3 10E3/UL (ref 0.8–5.3)
LYMPHOCYTES NFR BLD AUTO: 24 %
MCH RBC QN AUTO: 33.1 PG (ref 26.5–33)
MCHC RBC AUTO-ENTMCNC: 33 G/DL (ref 31.5–36.5)
MCV RBC AUTO: 101 FL (ref 78–100)
MONOCYTES # BLD AUTO: 0.8 10E3/UL (ref 0–1.3)
MONOCYTES NFR BLD AUTO: 15 %
NEUTROPHILS # BLD AUTO: 3.2 10E3/UL (ref 1.6–8.3)
NEUTROPHILS NFR BLD AUTO: 59 %
NRBC # BLD AUTO: 0 10E3/UL
NRBC BLD AUTO-RTO: 0 /100
PLATELET # BLD AUTO: 182 10E3/UL (ref 150–450)
POTASSIUM SERPL-SCNC: 3.8 MMOL/L (ref 3.4–5.3)
RBC # BLD AUTO: 4.35 10E6/UL (ref 4.4–5.9)
SODIUM SERPL-SCNC: 141 MMOL/L (ref 135–145)
TROPONIN T SERPL HS-MCNC: 8 NG/L
TROPONIN T SERPL HS-MCNC: 8 NG/L
WBC # BLD AUTO: 5.4 10E3/UL (ref 4–11)

## 2024-11-22 PROCEDURE — 99285 EMERGENCY DEPT VISIT HI MDM: CPT | Mod: 25

## 2024-11-22 PROCEDURE — 84295 ASSAY OF SERUM SODIUM: CPT | Performed by: EMERGENCY MEDICINE

## 2024-11-22 PROCEDURE — 83690 ASSAY OF LIPASE: CPT | Performed by: HOSPITALIST

## 2024-11-22 PROCEDURE — 84484 ASSAY OF TROPONIN QUANT: CPT | Performed by: EMERGENCY MEDICINE

## 2024-11-22 PROCEDURE — 85014 HEMATOCRIT: CPT | Performed by: EMERGENCY MEDICINE

## 2024-11-22 PROCEDURE — 93005 ELECTROCARDIOGRAM TRACING: CPT

## 2024-11-22 PROCEDURE — 85004 AUTOMATED DIFF WBC COUNT: CPT | Performed by: EMERGENCY MEDICINE

## 2024-11-22 PROCEDURE — 82040 ASSAY OF SERUM ALBUMIN: CPT | Performed by: HOSPITALIST

## 2024-11-22 PROCEDURE — 80048 BASIC METABOLIC PNL TOTAL CA: CPT | Performed by: EMERGENCY MEDICINE

## 2024-11-22 PROCEDURE — 82565 ASSAY OF CREATININE: CPT | Performed by: EMERGENCY MEDICINE

## 2024-11-22 PROCEDURE — 83036 HEMOGLOBIN GLYCOSYLATED A1C: CPT | Performed by: STUDENT IN AN ORGANIZED HEALTH CARE EDUCATION/TRAINING PROGRAM

## 2024-11-22 PROCEDURE — 84155 ASSAY OF PROTEIN SERUM: CPT | Performed by: HOSPITALIST

## 2024-11-22 PROCEDURE — 36415 COLL VENOUS BLD VENIPUNCTURE: CPT | Performed by: EMERGENCY MEDICINE

## 2024-11-22 PROCEDURE — 82607 VITAMIN B-12: CPT | Performed by: STUDENT IN AN ORGANIZED HEALTH CARE EDUCATION/TRAINING PROGRAM

## 2024-11-22 PROCEDURE — 71046 X-RAY EXAM CHEST 2 VIEWS: CPT

## 2024-11-22 ASSESSMENT — ACTIVITIES OF DAILY LIVING (ADL)
ADLS_ACUITY_SCORE: 0

## 2024-11-22 ASSESSMENT — COLUMBIA-SUICIDE SEVERITY RATING SCALE - C-SSRS
2. HAVE YOU ACTUALLY HAD ANY THOUGHTS OF KILLING YOURSELF IN THE PAST MONTH?: NO
1. IN THE PAST MONTH, HAVE YOU WISHED YOU WERE DEAD OR WISHED YOU COULD GO TO SLEEP AND NOT WAKE UP?: NO
6. HAVE YOU EVER DONE ANYTHING, STARTED TO DO ANYTHING, OR PREPARED TO DO ANYTHING TO END YOUR LIFE?: NO

## 2024-11-23 ENCOUNTER — APPOINTMENT (OUTPATIENT)
Dept: CARDIOLOGY | Facility: CLINIC | Age: 74
End: 2024-11-23
Attending: STUDENT IN AN ORGANIZED HEALTH CARE EDUCATION/TRAINING PROGRAM
Payer: MEDICARE

## 2024-11-23 VITALS
HEIGHT: 66 IN | RESPIRATION RATE: 20 BRPM | SYSTOLIC BLOOD PRESSURE: 126 MMHG | OXYGEN SATURATION: 96 % | TEMPERATURE: 98 F | DIASTOLIC BLOOD PRESSURE: 90 MMHG | HEART RATE: 60 BPM | BODY MASS INDEX: 32.99 KG/M2 | WEIGHT: 205.25 LBS

## 2024-11-23 PROBLEM — R07.9 CHEST PAIN, UNSPECIFIED TYPE: Status: ACTIVE | Noted: 2024-11-23

## 2024-11-23 LAB
ALBUMIN SERPL BCG-MCNC: 4 G/DL (ref 3.5–5.2)
ALP SERPL-CCNC: 81 U/L (ref 40–150)
ALT SERPL W P-5'-P-CCNC: 9 U/L (ref 0–70)
AST SERPL W P-5'-P-CCNC: 24 U/L (ref 0–45)
ATRIAL RATE - MUSE: 61 BPM
BILIRUB DIRECT SERPL-MCNC: <0.2 MG/DL (ref 0–0.3)
BILIRUB SERPL-MCNC: 0.6 MG/DL
DIASTOLIC BLOOD PRESSURE - MUSE: NORMAL MMHG
EST. AVERAGE GLUCOSE BLD GHB EST-MCNC: 120 MG/DL
HBA1C MFR BLD: 5.8 %
INTERPRETATION ECG - MUSE: NORMAL
LIPASE SERPL-CCNC: 65 U/L (ref 13–60)
P AXIS - MUSE: -2 DEGREES
PR INTERVAL - MUSE: 252 MS
PROT SERPL-MCNC: 6.8 G/DL (ref 6.4–8.3)
QRS DURATION - MUSE: 118 MS
QT - MUSE: 448 MS
QTC - MUSE: 450 MS
R AXIS - MUSE: -8 DEGREES
SYSTOLIC BLOOD PRESSURE - MUSE: NORMAL MMHG
T AXIS - MUSE: -1 DEGREES
VENTRICULAR RATE- MUSE: 61 BPM
VIT B12 SERPL-MCNC: 1959 PG/ML (ref 232–1245)

## 2024-11-23 PROCEDURE — 99207 PR APP CREDIT; MD BILLING SHARED VISIT: CPT | Performed by: HOSPITALIST

## 2024-11-23 PROCEDURE — G0378 HOSPITAL OBSERVATION PER HR: HCPCS

## 2024-11-23 PROCEDURE — 93288 INTERROG EVL PM/LDLS PM IP: CPT

## 2024-11-23 PROCEDURE — 99418 PROLNG IP/OBS E/M EA 15 MIN: CPT | Performed by: STUDENT IN AN ORGANIZED HEALTH CARE EDUCATION/TRAINING PROGRAM

## 2024-11-23 PROCEDURE — 250N000013 HC RX MED GY IP 250 OP 250 PS 637: Performed by: STUDENT IN AN ORGANIZED HEALTH CARE EDUCATION/TRAINING PROGRAM

## 2024-11-23 PROCEDURE — 99222 1ST HOSP IP/OBS MODERATE 55: CPT | Mod: 25 | Performed by: INTERNAL MEDICINE

## 2024-11-23 PROCEDURE — 250N000013 HC RX MED GY IP 250 OP 250 PS 637: Performed by: HOSPITALIST

## 2024-11-23 PROCEDURE — 99236 HOSP IP/OBS SAME DATE HI 85: CPT | Performed by: STUDENT IN AN ORGANIZED HEALTH CARE EDUCATION/TRAINING PROGRAM

## 2024-11-23 RX ORDER — MAGNESIUM HYDROXIDE/ALUMINUM HYDROXICE/SIMETHICONE 120; 1200; 1200 MG/30ML; MG/30ML; MG/30ML
30 SUSPENSION ORAL EVERY 4 HOURS PRN
Status: DISCONTINUED | OUTPATIENT
Start: 2024-11-23 | End: 2024-11-23 | Stop reason: HOSPADM

## 2024-11-23 RX ORDER — ACETAMINOPHEN 650 MG/1
650 SUPPOSITORY RECTAL EVERY 4 HOURS PRN
Status: DISCONTINUED | OUTPATIENT
Start: 2024-11-23 | End: 2024-11-23 | Stop reason: HOSPADM

## 2024-11-23 RX ORDER — TRAZODONE HYDROCHLORIDE 100 MG/1
100 TABLET ORAL AT BEDTIME
COMMUNITY

## 2024-11-23 RX ORDER — FUROSEMIDE 20 MG/1
10 TABLET ORAL DAILY
COMMUNITY

## 2024-11-23 RX ORDER — CARBOXYMETHYLCELLULOSE SODIUM 5 MG/ML
2 SOLUTION/ DROPS OPHTHALMIC DAILY
Status: DISCONTINUED | OUTPATIENT
Start: 2024-11-23 | End: 2024-11-23 | Stop reason: HOSPADM

## 2024-11-23 RX ORDER — FERROUS SULFATE 324(65)MG
65 TABLET, DELAYED RELEASE (ENTERIC COATED) ORAL EVERY OTHER DAY
COMMUNITY

## 2024-11-23 RX ORDER — HYDRALAZINE HYDROCHLORIDE 20 MG/ML
10 INJECTION INTRAMUSCULAR; INTRAVENOUS EVERY 4 HOURS PRN
Status: DISCONTINUED | OUTPATIENT
Start: 2024-11-23 | End: 2024-11-23 | Stop reason: HOSPADM

## 2024-11-23 RX ORDER — SPIRONOLACTONE 25 MG/1
12.5 TABLET ORAL DAILY
COMMUNITY

## 2024-11-23 RX ORDER — SOTALOL HYDROCHLORIDE 120 MG/1
120 TABLET ORAL EVERY 12 HOURS SCHEDULED
Status: DISCONTINUED | OUTPATIENT
Start: 2024-11-23 | End: 2024-11-23 | Stop reason: HOSPADM

## 2024-11-23 RX ORDER — FUROSEMIDE 20 MG/1
10 TABLET ORAL DAILY
Status: DISCONTINUED | OUTPATIENT
Start: 2024-11-23 | End: 2024-11-23 | Stop reason: HOSPADM

## 2024-11-23 RX ORDER — MEXILETINE HYDROCHLORIDE 150 MG/1
150 CAPSULE ORAL EVERY 8 HOURS SCHEDULED
Status: DISCONTINUED | OUTPATIENT
Start: 2024-11-23 | End: 2024-11-23

## 2024-11-23 RX ORDER — CARVEDILOL 12.5 MG/1
12.5 TABLET ORAL 2 TIMES DAILY
Status: DISCONTINUED | OUTPATIENT
Start: 2024-11-23 | End: 2024-11-23 | Stop reason: HOSPADM

## 2024-11-23 RX ORDER — ACETAMINOPHEN 500 MG
500-1000 TABLET ORAL 3 TIMES DAILY PRN
Status: DISCONTINUED | OUTPATIENT
Start: 2024-11-23 | End: 2024-11-23

## 2024-11-23 RX ORDER — TRAZODONE HYDROCHLORIDE 100 MG/1
100 TABLET ORAL AT BEDTIME
Status: DISCONTINUED | OUTPATIENT
Start: 2024-11-23 | End: 2024-11-23 | Stop reason: HOSPADM

## 2024-11-23 RX ORDER — LEVOTHYROXINE SODIUM 50 UG/1
50 TABLET ORAL DAILY
COMMUNITY

## 2024-11-23 RX ORDER — MEXILETINE HYDROCHLORIDE 150 MG/1
150 CAPSULE ORAL ONCE
Status: COMPLETED | OUTPATIENT
Start: 2024-11-23 | End: 2024-11-23

## 2024-11-23 RX ORDER — SPIRONOLACTONE 25 MG
12.5 TABLET ORAL DAILY
Status: DISCONTINUED | OUTPATIENT
Start: 2024-11-23 | End: 2024-11-23 | Stop reason: HOSPADM

## 2024-11-23 RX ORDER — MEXILETINE HYDROCHLORIDE 150 MG/1
150 CAPSULE ORAL 3 TIMES DAILY
COMMUNITY

## 2024-11-23 RX ORDER — PANTOPRAZOLE SODIUM 40 MG/1
40 TABLET, DELAYED RELEASE ORAL EVERY MORNING
Status: DISCONTINUED | OUTPATIENT
Start: 2024-11-23 | End: 2024-11-23 | Stop reason: HOSPADM

## 2024-11-23 RX ORDER — MEXILETINE HYDROCHLORIDE 150 MG/1
150 CAPSULE ORAL EVERY 8 HOURS SCHEDULED
Status: DISCONTINUED | OUTPATIENT
Start: 2024-11-23 | End: 2024-11-23 | Stop reason: HOSPADM

## 2024-11-23 RX ORDER — SOTALOL HYDROCHLORIDE 120 MG/1
120 TABLET ORAL 2 TIMES DAILY
COMMUNITY

## 2024-11-23 RX ORDER — LEVOTHYROXINE SODIUM 50 UG/1
50 TABLET ORAL
Status: DISCONTINUED | OUTPATIENT
Start: 2024-11-23 | End: 2024-11-23 | Stop reason: HOSPADM

## 2024-11-23 RX ORDER — DAPAGLIFLOZIN 10 MG/1
10 TABLET, FILM COATED ORAL DAILY
COMMUNITY

## 2024-11-23 RX ORDER — ASPIRIN 81 MG/1
81 TABLET ORAL DAILY
Status: DISCONTINUED | OUTPATIENT
Start: 2024-11-24 | End: 2024-11-23

## 2024-11-23 RX ORDER — ASPIRIN 81 MG/1
81 TABLET ORAL DAILY
Status: DISCONTINUED | OUTPATIENT
Start: 2024-11-23 | End: 2024-11-23 | Stop reason: HOSPADM

## 2024-11-23 RX ORDER — NITROGLYCERIN 0.4 MG/1
0.4 TABLET SUBLINGUAL EVERY 5 MIN PRN
Status: DISCONTINUED | OUTPATIENT
Start: 2024-11-23 | End: 2024-11-23 | Stop reason: HOSPADM

## 2024-11-23 RX ORDER — ACETAMINOPHEN 325 MG/1
650 TABLET ORAL EVERY 4 HOURS PRN
Status: DISCONTINUED | OUTPATIENT
Start: 2024-11-23 | End: 2024-11-23 | Stop reason: HOSPADM

## 2024-11-23 RX ORDER — SENNOSIDES 8.6 MG
2 TABLET ORAL DAILY PRN
Status: DISCONTINUED | OUTPATIENT
Start: 2024-11-23 | End: 2024-11-23 | Stop reason: HOSPADM

## 2024-11-23 RX ORDER — DAPAGLIFLOZIN 5 MG/1
10 TABLET, FILM COATED ORAL DAILY
Status: DISCONTINUED | OUTPATIENT
Start: 2024-11-23 | End: 2024-11-23 | Stop reason: HOSPADM

## 2024-11-23 RX ADMIN — SACUBITRIL AND VALSARTAN 1 TABLET: 97; 103 TABLET, FILM COATED ORAL at 11:27

## 2024-11-23 RX ADMIN — MEXILETINE HYDROCHLORIDE 150 MG: 150 CAPSULE ORAL at 01:34

## 2024-11-23 RX ADMIN — SOTALOL HYDROCHLORIDE 120 MG: 120 TABLET ORAL at 11:28

## 2024-11-23 RX ADMIN — LEVOTHYROXINE SODIUM 50 MCG: 50 TABLET ORAL at 11:16

## 2024-11-23 RX ADMIN — DAPAGLIFLOZIN 10 MG: 5 TABLET, FILM COATED ORAL at 11:29

## 2024-11-23 RX ADMIN — ASPIRIN 81 MG: 81 TABLET, COATED ORAL at 11:16

## 2024-11-23 RX ADMIN — TRAZODONE HYDROCHLORIDE 100 MG: 50 TABLET ORAL at 01:34

## 2024-11-23 RX ADMIN — SPIRONOLACTONE 12.5 MG: 25 TABLET ORAL at 11:38

## 2024-11-23 RX ADMIN — CARBOXYMETHYLCELLULOSE SODIUM 2 DROP: 5 SOLUTION/ DROPS OPHTHALMIC at 11:30

## 2024-11-23 RX ADMIN — FUROSEMIDE 10 MG: 20 TABLET ORAL at 11:29

## 2024-11-23 RX ADMIN — CARVEDILOL 12.5 MG: 12.5 TABLET, FILM COATED ORAL at 11:16

## 2024-11-23 RX ADMIN — MEXILETINE HYDROCHLORIDE 150 MG: 150 CAPSULE ORAL at 11:29

## 2024-11-23 RX ADMIN — PANTOPRAZOLE SODIUM 40 MG: 40 TABLET, DELAYED RELEASE ORAL at 11:16

## 2024-11-23 ASSESSMENT — ACTIVITIES OF DAILY LIVING (ADL)
ADLS_ACUITY_SCORE: 0

## 2024-11-23 NOTE — PROGRESS NOTES
Admission/Transfer from: ED  2 RN skin assessment completed. Yes  Name of 2nd RN: Chula PONCE RN  Significant findings include: None  WOC Nurse Consult Ordered? No

## 2024-11-23 NOTE — PROGRESS NOTES
RECEIVING UNIT ED HANDOFF REVIEW    ED Nurse Handoff Report was reviewed by: Ching Quinteros RN on November 23, 2024 at 3:14 AM

## 2024-11-23 NOTE — PLAN OF CARE
Goal Outcome Evaluation:         PRIMARY Concern: Chest Pain/tightness  SAFETY RISK Concerns (fall risk, behaviors, etc.): Fall      Aggression Tool Color: Green  Isolation/Type: None  Tests/Procedures for NEXT shift: Need to re-attempt ICD interrogation  Consults? (Pending/following, signed-off?) Cardiology consult pending  Where is patient from? (Home, TCU, etc.): Home  Other Important info for NEXT shift: 06:00 Mexitil dose not reconciled by pharmacy yet; wife stayed at bedside overnight  Anticipated DC date & active delays: To be decided pending consults  _____________________________________________________________________________  SUMMARY NOTE:    Orientation/Cognitive: A&O x 4; WDL  Observation Goals (Met/ Not Met): Not met  Mobility Level/Assist Equipment: SBA  Antibiotics & Plan (IV/po, length of tx left): None  Pain Management: Pt denied pain  Tele/VS/O2: Tele 100% A-Paced; AVSS; RA  ABNL Lab/BG: Hgb A1c 5.8; Vit B12 1,959; Trops negative x 2  Diet: Low fat, low Na; no caffeine  Bowel/Bladder: Continent of B/B  Skin Concerns: None  Drains/Devices: PIV SL'd  Patient Stated Goal for Today: Rest

## 2024-11-23 NOTE — PLAN OF CARE
Goal Outcome Evaluation:  PRIMARY Concern: Chest Pain/tightness  SAFETY RISK Concerns (fall risk, behaviors, etc.): Fall      Aggression Tool Color: Green  Isolation/Type: None  Tests/Procedures for NEXT shift: Need to re-attempt ICD interrogation  Consults? (Pending/following, signed-off?) Cardiology consult pending  Where is patient from? (Home, TCU, etc.): Home  Other Important info for NEXT shift: 06:00 Mexitil dose not reconciled by pharmacy yet; wife stayed at bedside overnight  Anticipated DC date & active delays: To be decided pending consults  _____________________________________________________________________________  SUMMARY NOTE:    Orientation/Cognitive: A&O x 4; WDL  Observation Goals (Met/ Not Met): Not met  Mobility Level/Assist Equipment: SBA  Antibiotics & Plan (IV/po, length of tx left): None  Pain Management: Pt denied pain  Tele/VS/O2: Tele 100% A-Paced; AVSS; RA  ABNL Lab/BG: Hgb A1c 5.8; Vit B12 1,959; Trops negative x 2  Diet: Low fat, low Na; no caffeine  Bowel/Bladder: Continent of B/B  Skin Concerns: None  Drains/Devices: PIV SL'd  Patient Stated Goal for Today: Rest       Pt A/O x 4. No complaint of pain. IV removed. Discharge instructions received. Questions answered. All belongings with pt. Pt wheeled out to where wife took pt home.

## 2024-11-23 NOTE — PROGRESS NOTES
RECEIVING UNIT ED HANDOFF REVIEW    ED Nurse Handoff Report was reviewed by: Ching Quinteros RN on November 23, 2024 at 3:18 AM

## 2024-11-23 NOTE — PHARMACY-ADMISSION MEDICATION HISTORY
Pharmacist Admission Medication History    Admission medication history is complete. The information provided in this note is only as accurate as the sources available at the time of the update.    Information Source(s): Family member and CareEverywhere/SureScripts via phone    Pertinent Information: Patient was instructed to finish his current supply of furosemide and spironolactone and then start taking them on alternating days. He is currently still taking them daily, however.     Changes made to PTA medication list:  Added: all medications   Deleted: None  Changed: None    Allergies reviewed with patient and updates made in EHR: no    Medication History Completed By: Kayla Luna [facility] 11/23/2024 11:04 AM    PTA Med List   Medication Sig Last Dose/Taking    acetaminophen (TYLENOL) 500 MG tablet Take 500-1,000 mg by mouth 3 times daily as needed for mild pain Taking As Needed    aspirin 81 MG EC tablet Take 81 mg by mouth daily Taking    atorvastatin (LIPITOR) 80 MG tablet Take 80 mg by mouth daily. Taking    carboxymethylcellulose PF (REFRESH PLUS) 0.5 % ophthalmic solution Place 2 drops into both eyes daily Taking    carvedilol (COREG) 25 MG tablet Take 12.5 mg by mouth 2 times daily (with meals). Taking    cholecalciferol 50 MCG (2000 UT) tablet Take 1 tablet by mouth daily. Taking    Coenzyme Q10 300 MG CAPS Take 300 mg by mouth at bedtime. Taking    cyanocobalamin (VITAMIN B-12) 1000 MCG tablet Take 2,000 mcg by mouth daily Taking    dapagliflozin (FARXIGA) 10 MG TABS tablet Take 10 mg by mouth daily. Taking    Ferrous Sulfate 324 (65 Fe) MG TBEC Take 65 mg by mouth every other day. Taking    fish oil-omega-3 fatty acids 1000 MG capsule Take 2 g by mouth daily  Taking    furosemide (LASIX) 20 MG tablet Take 10 mg by mouth daily. Taking    latanoprost (XALATAN) 0.005 % ophthalmic solution Place 1 drop into both eyes At Bedtime Taking    magnesium oxide (MAG-OX) 400 (241.3 Mg) MG tablet Take 400 mg by mouth daily.  Taking    mexiletine (MEXITIL) 150 MG capsule Take 150 mg by mouth 3 times daily. Taking    multivitamin, therapeutic (THERA-VIT) TABS tablet Take 1 tablet by mouth daily Taking    nitroGLYcerin (NITROSTAT) 0.4 MG sublingual tablet Place 0.4 mg under the tongue Taking    omeprazole (PRILOSEC) 40 MG DR capsule Take 40 mg by mouth Taking    rivaroxaban ANTICOAGULANT (XARELTO) 20 MG TABS tablet Take 20 mg by mouth Taking    sacubitril-valsartan (ENTRESTO)  MG per tablet Take 1 tablet by mouth 2 times daily. Taking    sotalol (BETAPACE) 120 MG tablet Take 120 mg by mouth 2 times daily. Taking    spironolactone (ALDACTONE) 25 MG tablet Take 12.5 mg by mouth daily. Taking    traZODone (DESYREL) 100 MG tablet Take 100 mg by mouth at bedtime. Taking    vitamin C (ASCORBIC ACID) 1000 MG TABS Take 2,000 mg by mouth daily Taking

## 2024-11-23 NOTE — CONSULTS
Cardiology Consultation      Candido Doe MRN# [id number]   YOB: 1950 Age: 74 year old   Date of Admission: 11/22/2024     Reason for consult: Atypical chest pain           Assessment and Plan:     Atypical chest pain, either from arrhythmia or heartburn from mexiletine.  No evidence of acute coronary syndrome on blood work.  Patient feels at baseline.  Patient requests discharge today.  He prefers not to have any cardiovascular medication adjustments done that are not from his primary [facility] service with whom he has the utmost confidence and rosas in.  If additional beta-blockade would be helpful to reduce arrhythmia, could consider reducing or discontinuing his spironolactone and/or furosemide to make room in the blood pressure for more carvedilol.  At this point, defer to his outpatient [facility] cardiology team.  Okay for discharge today with follow-up with them.    Moderate complexity          The longitudinal plan of care for the diagnosis(es)/condition(s) as documented were addressed during this visit. Due to the added complexity in care, I will continue to support Candido in the subsequent management and with ongoing continuity of care.          Chief Complaint:   Chest Pain           History of Present Illness:   This patient is a 74 year old male with history of ischemic cardiomyopathy and STEMI.  He has seen Diallo Leigh at [facility] and also electrophysiology.  He is on mexiletine and sotalol.  He has a history of ventricular tachycardia.    He had some chest discomfort that in retrospect thinks it might have been heartburn from the mexiletine.  It was not associated with dyspnea or presyncope.  It was not similar to his previous acute coronary syndrome.    Troponin was minimally elevated without peak and curve of acute coronary syndrome.    Symptoms have resolved.    Patient expresses his desire to go home today and not have any adjustments without the permission of his  "primary [facility] team         Physical Exam:   Vitals were reviewed  Blood pressure (!) 126/90, pulse 60, temperature 98  F (36.7  C), temperature source Oral, resp. rate 20, height 1.676 m (5' 6\"), weight 93.1 kg (205 lb 4 oz), SpO2 96%.  Temperatures:  Current - Temp: 98  F (36.7  C); Max - Temp  Av  F (36.7  C)  Min: 97.2  F (36.2  C)  Max: 98.5  F (36.9  C)  Respiration range: Resp  Av  Min: 16  Max: 20  Pulse range: Pulse  Av.4  Min: 55  Max: 62  Blood pressure range: Systolic (24hrs), Av , Min:121 , Max:147   ; Diastolic (24hrs), Av, Min:75, Max:90    Pulse oximetry range: SpO2  Av.6 %  Min: 95 %  Max: 97 %  No intake or output data in the 24 hours ending 24 1306  Constitutional:   awake, alert, cooperative, no apparent distress, and appears stated age     Eyes:   Lids and lashes normal, pupils equal, round and reactive to light, extra ocular muscles intact, sclera clear, conjunctiva normal     Neck:   supple, symmetrical, trachea midline,      Back:   symmetric     Lungs:   Symmetric     Cardiovascular:   Regular, no ectopy     Abdomen:   non-tender     Musculoskeletal:   motor strength is 5 out of 5 all extremities bilaterally     Neurologic:   Grossly nonfocal     Skin:   normal skin color, texture, turgor     Additional findings:                    Past Medical History:   I have reviewed this patient's past medical history  Past Medical History:   Diagnosis Date    CAD (coronary artery disease)     Gastroesophageal reflux disease with esophagitis without hemorrhage     Hyperlipidemia LDL goal <100     Hypertension     Myocardial infarction (H)     Paroxysmal atrial fibrillation (H)     Paroxysmal ventricular tachycardia (H)     dual chamber ICD 2020    Sleep apnea              Past Surgical History:   I have reviewed this patient's past surgical history  Past Surgical History:   Procedure Laterality Date    ARTHROPLASTY PATELLO-FEMORAL (KNEE)  2020    CARDIAC " SURGERY      2 stents for MI    CHOLECYSTECTOMY      CV CORONARY ANGIOGRAM N/A 11/7/2020    Procedure: Heart Catheterization with Possible Intervention;  Surgeon: Rocio Cardenas MD;  Location:  HEART CARDIAC CATH LAB    EP ICD N/A 11/8/2020    Procedure: Implantable Cardioverter-Defibrillator (ICD);  Surgeon: Amari Jacob MD;  Location:  HEART CARDIAC CATH LAB    GENITOURINARY SURGERY      vasectomy    HERNIA REPAIR      ORTHOPEDIC SURGERY      left hip    right knee replacement Right                Social History:   I have reviewed this patient's social history  Social History     Tobacco Use    Smoking status: Former    Smokeless tobacco: Not on file    Tobacco comments:     quit 35-40 years ago   Substance Use Topics    Alcohol use: Yes     Comment: occ             Family History:   I have reviewed this patient's family history  Family History   Problem Relation Age of Onset    Heart Failure Mother     LUNG DISEASE Mother     Myocardial Infarction Father     Heart Failure Father              Allergies:     Allergies   Allergen Reactions    No Clinical Screening - See Comments Angioedema     Ate Escargot    Slug Extract  [Snail Extract] Angioedema             Medications:   I have reviewed this patient's current medications  Medications Prior to Admission   Medication Sig Dispense Refill Last Dose/Taking    acetaminophen (TYLENOL) 500 MG tablet Take 500-1,000 mg by mouth 3 times daily as needed for mild pain   Taking As Needed    Coenzyme Q10 300 MG CAPS Take 300 mg by mouth at bedtime.   11/22/2024    levothyroxine (SYNTHROID/LEVOTHROID) 50 MCG tablet Take 50 mcg by mouth daily.   Taking    nitroGLYcerin (NITROSTAT) 0.4 MG sublingual tablet Place 0.4 mg under the tongue   Taking    aspirin 81 MG EC tablet Take 81 mg by mouth daily       atorvastatin (LIPITOR) 80 MG tablet Take 80 mg by mouth daily.       carboxymethylcellulose PF (REFRESH PLUS) 0.5 % ophthalmic solution Place 2 drops into  both eyes daily       carvedilol (COREG) 12.5 MG tablet Take 12.5 mg by mouth 2 times daily (with meals).       cholecalciferol 50 MCG (2000 UT) tablet Take 1 tablet by mouth daily.       cyanocobalamin (VITAMIN B-12) 1000 MCG tablet Take 2,000 mcg by mouth daily       dapagliflozin (FARXIGA) 10 MG TABS tablet Take 10 mg by mouth daily.       Ferrous Sulfate 324 (65 Fe) MG TBEC Take 65 mg by mouth every other day.       fish oil-omega-3 fatty acids 1000 MG capsule Take 2 g by mouth daily        furosemide (LASIX) 20 MG tablet Take 10 mg by mouth daily.       latanoprost (XALATAN) 0.005 % ophthalmic solution Place 1 drop into both eyes At Bedtime       magnesium oxide (MAG-OX) 400 (241.3 Mg) MG tablet Take 400 mg by mouth daily.       mexiletine (MEXITIL) 150 MG capsule Take 150 mg by mouth 3 times daily.       multivitamin, therapeutic (THERA-VIT) TABS tablet Take 1 tablet by mouth daily       omeprazole (PRILOSEC) 40 MG DR capsule Take 40 mg by mouth       rivaroxaban ANTICOAGULANT (XARELTO) 20 MG TABS tablet Take 20 mg by mouth       sacubitril-valsartan (ENTRESTO)  MG per tablet Take 1 tablet by mouth 2 times daily.       sotalol (BETAPACE) 120 MG tablet Take 120 mg by mouth 2 times daily.       spironolactone (ALDACTONE) 25 MG tablet Take 12.5 mg by mouth daily.       traZODone (DESYREL) 100 MG tablet Take 100 mg by mouth at bedtime.       vitamin C (ASCORBIC ACID) 1000 MG TABS Take 2,000 mg by mouth daily        Current Facility-Administered Medications   Medication Dose Route Frequency Provider Last Rate Last Admin    acetaminophen (TYLENOL) tablet 650 mg  650 mg Oral Q4H PRN Kaor Monsivais MD        Or    acetaminophen (TYLENOL) Suppository 650 mg  650 mg Rectal Q4H PRN Karo Monsivais MD        alum & mag hydroxide-simethicone (MAALOX) suspension 30 mL  30 mL Oral Q4H PRN Karo Monsivais MD        aspirin EC tablet 81 mg  81 mg Oral Daily Karo Monsivais MD   81 mg at 11/23/24 1936     carboxymethylcellulose PF (REFRESH PLUS) 0.5 % ophthalmic solution 2 drop  2 drop Both Eyes Daily Nidia Quick MD   2 drop at 11/23/24 1130    carvedilol (COREG) tablet 12.5 mg  12.5 mg Oral BID Karo Monsivais MD   12.5 mg at 11/23/24 1116    dapagliflozin (FARXIGA) tablet 10 mg  10 mg Oral Daily Karo Monsivais MD   10 mg at 11/23/24 1129    furosemide (LASIX) half-tab 10 mg  10 mg Oral Daily aKro Monsivais MD   10 mg at 11/23/24 1129    hydrALAZINE (APRESOLINE) injection 10 mg  10 mg Intravenous Q4H PRN Karo Monsivais MD        levothyroxine (SYNTHROID/LEVOTHROID) tablet 50 mcg  50 mcg Oral QAM AC Nidia Quick MD   50 mcg at 11/23/24 1116    mexiletine (MEXITIL) capsule 150 mg  150 mg Oral Q8H [facility] Karo Monsivais MD   150 mg at 11/23/24 1129    nitroGLYcerin (NITROSTAT) sublingual tablet 0.4 mg  0.4 mg Sublingual Q5 Min PRN Karo Monsivais MD        pantoprazole (PROTONIX) EC tablet 40 mg  40 mg Oral QAM Nidia Quick MD   40 mg at 11/23/24 1116    rivaroxaban ANTICOAGULANT (XARELTO) tablet 20 mg  20 mg Oral Daily with supper Karo Monsivais MD        sacubitril-valsartan (ENTRESTO)  MG per tablet 1 tablet  1 tablet Oral BID Karo Monsivais MD   1 tablet at 11/23/24 1127    sennosides (SENOKOT) tablet 2 tablet  2 tablet Oral Daily PRN Karo Monsivais MD        sotalol (BETAPACE) tablet 120 mg  120 mg Oral Q12H [facility] (08/20) Karo Monsivais MD   120 mg at 11/23/24 1128    spironolactone (ALDACTONE) half-tab 12.5 mg  12.5 mg Oral Daily Karo Monsivais MD   12.5 mg at 11/23/24 1138    traZODone (DESYREL) tablet 100 mg  100 mg Oral At Bedtime Karo Monsivais MD   100 mg at 11/23/24 0134             Review of Systems:     The 10 point Review of Systems is negative other than noted in the HPI            Data:   All laboratory data reviewed  Results for orders placed or performed during the hospital encounter of 11/22/24 (from the past 24 hours)   [place] Draw  *Canceled*    Narrative    The following orders were created for panel order [place] Draw.  Procedure                               Abnormality         Status                     ---------                               -----------         ------                       Please view results for these tests on the individual orders.   Troponin T, High Sensitivity   Result Value Ref Range    Troponin T, High Sensitivity 8 <=22 ng/L   CBC with platelets + differential    Narrative    The following orders were created for panel order CBC with platelets + differential.  Procedure                               Abnormality         Status                     ---------                               -----------         ------                     CBC with platelets and d...[[id number]]  Abnormal            Final result                 Please view results for these tests on the individual orders.   Basic metabolic panel   Result Value Ref Range    Sodium 141 135 - 145 mmol/L    Potassium 3.8 3.4 - 5.3 mmol/L    Chloride 103 98 - 107 mmol/L    Carbon Dioxide (CO2) 28 22 - 29 mmol/L    Anion Gap 10 7 - 15 mmol/L    Urea Nitrogen 16.1 8.0 - 23.0 mg/dL    Creatinine 1.00 0.67 - 1.17 mg/dL    GFR Estimate 78 >60 mL/min/1.73m2    Calcium 9.1 8.8 - 10.4 mg/dL    Glucose 118 (H) 70 - 99 mg/dL   [place] Draw    Narrative    The following orders were created for panel order [place] Draw.  Procedure                               Abnormality         Status                     ---------                               -----------         ------                     Extra Blue Top Tube[[id number]]                              Final result               Extra Red Top Tube[[id number]]                               Final result                 Please view results for these tests on the individual orders.   CBC with platelets and differential   Result Value Ref Range    WBC Count 5.4 4.0 - 11.0 10e3/uL    RBC Count 4.35 (L) 4.40 - 5.90 10e6/uL     "Hemoglobin 14.4 13.3 - 17.7 g/dL    Hematocrit 43.7 40.0 - 53.0 %     (H) 78 - 100 fL    MCH 33.1 (H) 26.5 - 33.0 pg    MCHC 33.0 31.5 - 36.5 g/dL    RDW 12.8 10.0 - 15.0 %    Platelet Count 182 150 - 450 10e3/uL    % Neutrophils 59 %    % Lymphocytes 24 %    % Monocytes 15 %    % Eosinophils 2 %    % Basophils 0 %    % Immature Granulocytes 0 %    NRBCs per 100 WBC 0 <1 /100    Absolute Neutrophils 3.2 1.6 - 8.3 10e3/uL    Absolute Lymphocytes 1.3 0.8 - 5.3 10e3/uL    Absolute Monocytes 0.8 0.0 - 1.3 10e3/uL    Absolute Eosinophils 0.1 0.0 - 0.7 10e3/uL    Absolute Basophils 0.0 0.0 - 0.2 10e3/uL    Absolute Immature Granulocytes 0.0 <=0.4 10e3/uL    Absolute NRBCs 0.0 10e3/uL   Extra Blue Top Tube   Result Value Ref Range    Hold Specimen JIC    Extra Red Top Tube   Result Value Ref Range    Hold Specimen JIC    Hemoglobin A1c   Result Value Ref Range    Estimated Average Glucose 120 (H) <117 mg/dL    Hemoglobin A1C 5.8 (H) <5.7 %   Hepatic panel   Result Value Ref Range    Protein Total 6.8 6.4 - 8.3 g/dL    Albumin 4.0 3.5 - 5.2 g/dL    Bilirubin Total 0.6 <=1.2 mg/dL    Alkaline Phosphatase 81 40 - 150 U/L    AST 24 0 - 45 U/L    ALT 9 0 - 70 U/L    Bilirubin Direct <0.20 0.00 - 0.30 mg/dL   Lipase   Result Value Ref Range    Lipase 65 (H) 13 - 60 U/L   XR Chest 2 Views    Narrative    EXAM: XR CHEST 2 VIEWS  LOCATION: [facility]  DATE: 11/22/2024    INDICATION: chest pain  COMPARISON: 11/9/2020.      Impression    IMPRESSION: No acute abnormality in the chest or interval change. Stable left cardiac pacer/ventilator. Mild cardia megaly. Lungs are clear. No pleural effusion or pneumothorax. Mild elevation of left hemidiaphragm, stable. Prior cholecystectomy.   Troponin T, High Sensitivity   Result Value Ref Range    Troponin T, High Sensitivity 8 <=22 ng/L   Vitamin B12   Result Value Ref Range    Vitamin B12 1,959 (H) 232 - 1,245 pg/mL       No results found for: \"CHOL\"  No " "results found for: \"HDL\"  No results found for: \"LDL\"  No results found for: \"TRIG\"  No results found for: \"CHOLHDLRATIO\"  TSH   Date Value Ref Range Status   11/09/2020 6.51 (H) 0.40 - 4.00 mU/L Final         EKG results:    Echocardiology:    Cardiac stress testing:    Cardiac angiography:    Clinically Significant Risk Factors Present on Admission                # Drug Induced Coagulation Defect: home medication list includes an anticoagulant medication  # Drug Induced Platelet Defect: home medication list includes an antiplatelet medication             # Obesity: Estimated body mass index is 33.13 kg/m  as calculated from the following:    Height as of this encounter: 1.676 m (5' 6\").    Weight as of this encounter: 93.1 kg (205 lb 4 oz).        # ICD device present      "

## 2024-11-23 NOTE — PROGRESS NOTES
Observation Goals:    - Serial troponins and stress test complete. - partially met - [facility] faxed results  - Seen and cleared by consultant if applicable - not met; Cardiology consult pending  - Adequate pain control on oral analgesia - met  - Vital signs normal or at patient baseline - met  - Safe disposition plan has been identified - met

## 2024-11-23 NOTE — H&P
"Assessment / Plan    74-year-old gentleman with a history of ischemic cardiomyopathy in the setting of prior STEMI (August 2014) with PCI to the RCA, ischemic cardiomyopathy with an EF (improved to 45% with GDMT), paroxysmal atrial fibrillation on anticoagulation, out-of-hospital cardiac arrest (October 2020), and status post ICD for secondary prevention, hypothyroidism, obesity presenting w/ L sided chest pain.     --Hx: Patient presents w/ complaints of multiple episodes of L sided chest pressure, each lasting upto a minute,  over a period of 2.5hrs at rest, without dyspnea or palpitations. No aggrevating/alleviating factors and non radiating. Complaint w/ all medications. No CP at [facility]. Of note, patient from September to October of 2024 had recurrent asymptomatic VT episodes terminating w/ ATP without shocking. Patient was subsequently started on Mexitil with positive effect on rate of VT (though mild lightheadedness is a sideeffect)   --Vitals/Exam: SBP 140s. No LE edema, no chest tenderness, lungs clear. No murmurs. Abdominal bloating present.   --EKG:  --Imaging: No acute pathology in CXR  --ER Course: N/A    L chest pain, ACS rule out  CAD, s/p PCI RCA 2014  HFrEF, ischemic etiology  HTN  --TTE showed EF 40% in 11/2024   --LHC performed in 2023 due to a stress test showing per cardiology \"very extensive area of infarction in the apical, inferoseptal, inferior, and lateral segments, but also showed a small area of di-infarct ischemia \" The C showed that the RCA stent is patent, with 40% LAD stenosis  --Repeat Myocardial perfusion stress test performed at [place] at 11/2024 suggests no new changes  --Trops negative x2  --PRN hydralazine  --resume statin once confirmed  --prn nitroglycerin  --resumed aldactone, lasix, coreg, entresto (doses should be confirmed in AM)  --aspirin 81 resumed  --Re attempt Device interrogation (the one in the ER failed to provide a comprehensive evaluation for arrythmias)    Afib on " AC  Hx of VT, and Out of Hospital cardiac 2020 arrest,   s/p ICD  --Resumed sotalol, mexitil and xeralto  --telemetry    Obesity  PreDM  --A1c  --outpatient bariatrics    OA  --tylenol prn    VENTURA  Insomnia  --cpap  --resumed trazadone    Hypothyroidism  --resume synthroid once confirmed    GERD  --resume PPI once conifrmed    Macrocytosis  Hx B12 def  --b12/folate  --Outpatient follow-up to determine PPI dosing given b12 def    Glaucoma  --resume eye drops once confirmed    Supportive Care  --DVT ppx: xaralto  --Diet: cardiac  --Pain Control: tylenol  --Upper GI ppx: zofran  --Lower GI ppx: senna  -- ppx: none  --Lines/Catheters: IV  --TTE/Dopplers: None  --Contact/Seizure/Fall/Delerium Precautions: none  --PT/OT/Social/Wound/Palliative Consults: none  --Code Status: Full    History of Present Illness  --Hx: Patient presents w/ complaints of multiple episodes of L sided chest pressure, each lasting upto a minute,  over a period of 2.5hrs at rest, without dyspnea or palpitations. No aggrevating/alleviating factors and non radiating. Complaint w/ all medications. No CP at [facility]. Of note, patient from September to October of 2024 had recurrent asymptomatic VT episodes terminating w/ ATP without shocking. Patient was subsequently started on Mexitil with positive effect on rate of VT (though mild lightheadedness is a sideeffect)     ROS: 10 point ROS neg other than the symptoms noted above in the HPI.     Objective History  BP (!) 147/86   Pulse 60   Temp 97.2  F (36.2  C) (Temporal)   Resp 18   SpO2 97%     Constitutional: Alert and oriented to person, place and time; no apparent distress.   HEENT: Normocephalic, no scleral icterus  Abdomen: soft, no tenderness/guarding, abdominal bloating/distension present  Lungs: lungs clear to auscultation bilaterally  Heart: Regular s1s2, no evidence of murmurs  Neuro:No focal strength/sensation deficits  Skin/Extremities: No obvious signs of skin breakdown, no LE  edema  Psychiatric: appropriate affect, insight and judgment  Back: No midline tenderness, no CVA tenderness    I have personally spent 85 minutes total time today in preparing to see the patient (eg, review of tests), obtaining and/or reviewing separately obtained history, performing a medically appropriate examination and/or evaluation, counseling and educating the patient/family/caregiver, ordering medications, tests, or procedures, referring and communicating with other health care professionals, documenting clinical information in the electronic or other health record, independently interpreting results and communicating results to the patient/ family/caregiver and care coordination.

## 2024-11-23 NOTE — ED NOTES
"[facility]  ED Nurse Handoff Report    ED Chief complaint: Chest Pain      ED Diagnosis:   Final diagnoses:   Chest pain, unspecified type       Code Status: Inpatient team to discuss code status with patient    Allergies:   Allergies   Allergen Reactions    No Clinical Screening - See Comments Angioedema     Ate Escargot    Slug Extract  [Snail Extract] Angioedema       Patient Story: Pt arrives with c/o \"chest pressure in the area of the heart\". Hx cardiac arrest and STEMI. Defibrillator in place. Denies SOB, n/v. Pt reports it isn't painful, but \"is tightening\".       Takes 81 mg ASA each morning.        Focused Assessment:   Abnormal Labs Resulted from Time of ED Arrival to Time of ED Departure   BASIC METABOLIC PANEL - Abnormal       Result Value    Sodium 141      Potassium 3.8      Chloride 103      Carbon Dioxide (CO2) 28      Anion Gap 10      Urea Nitrogen 16.1      Creatinine 1.00      GFR Estimate 78      Calcium 9.1      Glucose 118 (*)    CBC WITH PLATELETS AND DIFFERENTIAL - Abnormal    WBC Count 5.4      RBC Count 4.35 (*)     Hemoglobin 14.4      Hematocrit 43.7       (*)     MCH 33.1 (*)     MCHC 33.0      RDW 12.8      Platelet Count 182      % Neutrophils 59      % Lymphocytes 24      % Monocytes 15      % Eosinophils 2      % Basophils 0      % Immature Granulocytes 0      NRBCs per 100 WBC 0      Absolute Neutrophils 3.2      Absolute Lymphocytes 1.3      Absolute Monocytes 0.8      Absolute Eosinophils 0.1      Absolute Basophils 0.0      Absolute Immature Granulocytes 0.0      Absolute NRBCs 0.0         XR Chest 2 Views   Final Result   IMPRESSION: No acute abnormality in the chest or interval change. Stable left cardiac pacer/ventilator. Mild cardia megaly. Lungs are clear. No pleural effusion or pneumothorax. Mild elevation of left hemidiaphragm, stable. Prior cholecystectomy.          Treatments and/or interventions provided:   Medications - No data to " display    Patient's response to treatments and/or interventions: Improving    To be done/followed up on inpatient unit:  Follow Plan of Care    Does this patient have any cognitive concerns?:  A&Ox4    Activity level - Baseline/Home:  Independent  Activity Level - Current:   Stand with Assist    Patient's Preferred language: English   Needed?: No    Isolation: None  Infection: Not Applicable  Patient tested for COVID 19 prior to admission: NO  Bariatric?: No    Vital Signs:   Vitals:    11/22/24 1923   BP: (!) 147/86   Pulse: 60   Resp: 18   Temp: 97.2  F (36.2  C)   TempSrc: Temporal   SpO2: 97%       Cardiac Rhythm:     Was the PSS-3 completed:   Yes  What interventions are required if any?               Family Comments: Family at Bedside  OBS brochure/video discussed/provided to patient/family: Yes    For the majority of the shift this patient's behavior was Green.   Behavioral interventions performed were None.    ED NURSE PHONE NUMBER: [phone]

## 2024-11-23 NOTE — DISCHARGE SUMMARY
"[facility]  Hospitalist Discharge Summary      Date of Admission:  11/22/2024  Date of Discharge:  11/23/2024  Discharging Provider: Nidia Quick MD  Discharge Service: Hospitalist Service    Discharge Diagnoses     Please refer to the hospital course below    Clinically Significant Risk Factors     # Obesity: Estimated body mass index is 33.13 kg/m  as calculated from the following:    Height as of this encounter: 1.676 m (5' 6\").    Weight as of this encounter: 93.1 kg (205 lb 4 oz).       Follow-ups Needed After Discharge   Follow-up Appointments       Follow-up and recommended labs and tests       Follow up with your cardiologist at [place] as needed.  Please call to schedule appointment after discharge.                 Unresulted Labs Ordered in the Past 30 Days of this Admission       No orders found for last 31 day(s).        These results will be followed up by none    Discharge Disposition   Discharged to home  Condition at discharge: Stable    Hospital Course     Candido Doe is a 74-year-old gentleman with a history of ischemic cardiomyopathy in the setting of prior STEMI (August 2014) with PCI to the RCA, ischemic cardiomyopathy with an EF (improved to 45% with GDMT), paroxysmal atrial fibrillation on anticoagulation, out-of-hospital cardiac arrest (October 2020), and status post ICD for secondary prevention, hypothyroidism, obesity presented with chest pressure.     --Hx: Patient presents w/ complaints of multiple episodes of Lt sided chest pressure, each lasting upto a minute,  over a period of 2.5hrs at rest, without dyspnea or palpitations.  He reported it was not chest pain.  No aggrevating/alleviating factors and non radiating. Complaint w/ all medications. Of note, patient from September to October of 2024 had recurrent asymptomatic VT episodes terminating w/ ATP without shocking. Patient was subsequently started on Mexitil with positive effect on rate of VT " "(though mild lightheadedness is a sideeffect)   --Vitals/Exam: SBP 140s. No LE edema, no chest tenderness, lungs clear. No murmurs. Abdominal bloating present.   --EKG:  --Imaging: No acute pathology in CXR  --ER Course: N/A     Lt chest pressure, ACS and arrhythmia ruled out  CAD, s/p PCI RCA 2014  HFrEF, ischemic etiology  HTN  Patient had TTE earlier this month which showed EF 40%.  This was unchanged from prior TTE in May. LHC performed in 2023 due to a stress test showing per cardiology \"very extensive area of infarction in the apical, inferoseptal, inferior, and lateral segments, but also showed a small area of di-infarct ischemia \" The LHC showed that the RCA stent is patent, with 40% LAD stenosis  * Repeat Myocardial perfusion stress test performed at [facility] at 11/2024 suggests no new changes  *Trops negative x2    PTA ASA, aldactone, lasix, coreg, sotalol, entresto, statin resumed  -Device interrogated, no arrhythmia noted.  -Patient remained symptom-free since arrival to ER.  Cardiology consulted and evaluated patient.  Given no arrhythmia on interrogation, negative troponin, resolution of symptom, suspect correct at this could be related to medication, mexiletine or reflux.  No medication changes was made and discharged home to follow-up with his cardiologist at [facility].     Afib on AC  Hx of VT, and Out of Hospital cardiac 2020 arrest,   s/p ICD  --Resumed sotalol, mexitil and xeralto.  No tachy or laurie arrhythmia on telemetry or device interrogation      Obesity  PreDM  --A1c 5.8  -Outpatient follow-up     OA  --tylenol prn     VENTURA  Insomnia  --cpap  --resumed trazadone     Hypothyroidism  --resumed synthroid       GERD  --resumed PPI      Macrocytosis  Hx B12 def  --b12 level high so supplement was discontinued at discharge/folate normal      Glaucoma  --resume eye drops      Consultations This Hospital Stay   CARDIOLOGY IP CONSULT    Code Status   Full Code    Time Spent on this Encounter   Nidia BURTON " MD Abdelrahman, personally saw the patient today and spent greater than 30 minutes discharging this patient.       Nidia Quick MD  [facility]TEND[facility] RECOV[facility] SHORT STAY  [address] [facility] [place] [address]  Phone: [phone]  ______________________________________________________________________    Physical Exam   Vital Signs: Temp: 98  F (36.7  C) Temp src: Oral BP: (!) 126/90 Pulse: 60   Resp: 20 SpO2: 96 % O2 Device: None (Room air)    Weight: 205 lbs 3.97 oz    General: AAOx3, appears comfortable.  HEENT: PERRLA EOMI. Mucosa moist.   Lungs: Bilateral equal air entry. Clear to auscultation, normal work of breathing.   CVS: S1S2 regular, no tachycardia or murmur.   Abdomen: Soft, NT, ND. BS heard.  MSK: No edema or deformities.  Neuro: AAOX3. CN 2-12 normal. Strength symmetrical.  Skin: No rash.          Primary Care Physician   Anup Cesar    Discharge Orders      Reason for your hospital stay    Chest pressure     Follow-up and recommended labs and tests     Follow up with your cardiologist at [place] as needed.  Please call to schedule appointment after discharge.     Activity    Your activity upon discharge: activity as tolerated     Diet    Follow this diet upon discharge: Current Diet:Orders Placed This Encounter      Combination Diet Low Saturated Fat Na <2400mg Diet, No Caffeine Diet       Significant Results and Procedures   Most Recent 3 CBC's:  Recent Labs   Lab Test 11/22/24 2104 11/09/20  0603 11/08/20  0535   WBC 5.4 6.9 7.0   HGB 14.4 12.0* 12.0*   * 98 97    277 313     Most Recent 3 BMP's:  Recent Labs   Lab Test 11/22/24 2104 11/09/20  0603 11/08/20  0535    141 141   POTASSIUM 3.8 3.9 3.7   CHLORIDE 103 108 110*   CO2 28 28 28   BUN 16.1 9 8   CR 1.00 0.76 0.70   ANIONGAP 10 5 3   BOBBY 9.1 8.6 8.9   * 101* 116*     Most Recent 2 LFT's:  Recent Labs   Lab Test 11/22/24 2104 11/09/20  0603   AST 24 34   ALT 9 16   ALKPHOS 81 99    BILITOTAL 0.6 0.6     Most Recent 3 Troponin's:  Recent Labs   Lab Test 11/06/20  2243 11/06/20  1728 11/06/20  1240 11/06/20  1228   TROPI 1.329* 1.219* 0.034  --    TROPONIN  --   --   --  0.00     Most Recent 3 BNP's:No lab results found.,   Results for orders placed or performed during the hospital encounter of 11/22/24   XR Chest 2 Views    Narrative    EXAM: XR CHEST 2 VIEWS  LOCATION: [facility]  DATE: 11/22/2024    INDICATION: chest pain  COMPARISON: 11/9/2020.      Impression    IMPRESSION: No acute abnormality in the chest or interval change. Stable left cardiac pacer/ventilator. Mild cardia megaly. Lungs are clear. No pleural effusion or pneumothorax. Mild elevation of left hemidiaphragm, stable. Prior cholecystectomy.       Discharge Medications   Current Discharge Medication List        CONTINUE these medications which have NOT CHANGED    Details   acetaminophen (TYLENOL) 500 MG tablet Take 500-1,000 mg by mouth 3 times daily as needed for mild pain      Coenzyme Q10 300 MG CAPS Take 300 mg by mouth at bedtime.      levothyroxine (SYNTHROID/LEVOTHROID) 50 MCG tablet Take 50 mcg by mouth daily.      nitroGLYcerin (NITROSTAT) 0.4 MG sublingual tablet Place 0.4 mg under the tongue      aspirin 81 MG EC tablet Take 81 mg by mouth daily      atorvastatin (LIPITOR) 80 MG tablet Take 80 mg by mouth daily.      carboxymethylcellulose PF (REFRESH PLUS) 0.5 % ophthalmic solution Place 2 drops into both eyes daily      carvedilol (COREG) 12.5 MG tablet Take 12.5 mg by mouth 2 times daily (with meals).      cholecalciferol 50 MCG (2000 UT) tablet Take 1 tablet by mouth daily.      cyanocobalamin (VITAMIN B-12) 1000 MCG tablet Take 2,000 mcg by mouth daily      dapagliflozin (FARXIGA) 10 MG TABS tablet Take 10 mg by mouth daily.      Ferrous Sulfate 324 (65 Fe) MG TBEC Take 65 mg by mouth every other day.      fish oil-omega-3 fatty acids 1000 MG capsule Take 2 g by mouth daily        furosemide (LASIX) 20 MG tablet Take 10 mg by mouth daily.      latanoprost (XALATAN) 0.005 % ophthalmic solution Place 1 drop into both eyes At Bedtime      magnesium oxide (MAG-OX) 400 (241.3 Mg) MG tablet Take 400 mg by mouth daily.      mexiletine (MEXITIL) 150 MG capsule Take 150 mg by mouth 3 times daily.      multivitamin, therapeutic (THERA-VIT) TABS tablet Take 1 tablet by mouth daily      omeprazole (PRILOSEC) 40 MG DR capsule Take 40 mg by mouth      rivaroxaban ANTICOAGULANT (XARELTO) 20 MG TABS tablet Take 20 mg by mouth      sacubitril-valsartan (ENTRESTO)  MG per tablet Take 1 tablet by mouth 2 times daily.      sotalol (BETAPACE) 120 MG tablet Take 120 mg by mouth 2 times daily.      spironolactone (ALDACTONE) 25 MG tablet Take 12.5 mg by mouth daily.      traZODone (DESYREL) 100 MG tablet Take 100 mg by mouth at bedtime.      vitamin C (ASCORBIC ACID) 1000 MG TABS Take 2,000 mg by mouth daily           Allergies   Allergies   Allergen Reactions    No Clinical Screening - See Comments Angioedema     Ate Escargot    Slug Extract  [Snail Extract] Angioedema

## 2024-11-23 NOTE — ED TRIAGE NOTES
"Pt arrives with c/o \"chest pressure in the area of the heart\". Hx cardiac arrest and STEMI. Defibrillator in place. Denies SOB, n/v. Pt reports it isn't painful, but \"is tightening\".       Takes 81 mg ASA each morning.  "

## 2024-11-23 NOTE — ED PROVIDER NOTES
"  Emergency Department Note      History of Present Illness     Chief Complaint   Chest Pain      HPI   Candido Doe is a 74 year old male with history of ischemic cardiomyopathy, STEMI, cardiac arrest x 2, and paroxysmal atrial fibrillation on Xarelto presents to the emergency department for chest pressure.  Patient reports that since 4 PM this afternoon, he has been having intermittent left-sided chest pressure.  Patient reports that he does not believe these are exertional in nature and does not feel similar to when he had his STEMI in the past in addition to a prior cardiac arrest.  Patient reports that there is no clear exacerbating or relieving factor.  Pain is limited to the left side of chest.  Patient denies any fever, chills, rhinorrhea, congestion, cough, shortness of breath, nausea, vomiting, abdominal pain, urinary symptoms.  He was recently evaluated at his cardiology outpatient office visit on the seventh with a echocardiogram demonstrating EF in the 40% and was told that his cardiac function has improved.    Independent Historian   None    Review of External Notes   11/7/2024 cardiology office visit note  \"We obtained an updated myocardial perfusion stress test. It was a SPECT as the PET/CT scanner was out of order this morning. This showed similar findings of the previous PET scan. Large area of infarction from the inferior and lateral segments but no dynamic ischemia.    His echocardiogram this morning demonstrates a left ventricular ejection fraction of 40% and mildly reduced right ventricular systolic function. No hemodynamically significant valvular heart disease. E to E prime 7.5 consistent with normal left ventricular filling pressure. Normal IVC size with normal inspiratory collapse consistent with a normal right atrial pressure. \"    Past Medical History     Medical History and Problem List   Past Medical History:   Diagnosis Date    CAD (coronary artery disease)     Gastroesophageal " reflux disease with esophagitis without hemorrhage     Hyperlipidemia LDL goal <100     Hypertension     Myocardial infarction (H)     Paroxysmal atrial fibrillation (H)     Paroxysmal ventricular tachycardia (H)     Sleep apnea        Medications   acetaminophen (TYLENOL) 500 MG tablet  amiodarone (PACERONE) 400 MG tablet  amLODIPine (NORVASC) 5 MG tablet  aspirin 81 MG EC tablet  atorvastatin (LIPITOR) 80 MG tablet  carboxymethylcellulose PF (REFRESH PLUS) 0.5 % ophthalmic solution  carvedilol (COREG) 25 MG tablet  cholecalciferol 50 MCG (2000 UT) tablet  co-enzyme Q-10 100 MG CAPS capsule  cyanocobalamin (VITAMIN B-12) 1000 MCG tablet  fish oil-omega-3 fatty acids 1000 MG capsule  ipratropium (ATROVENT) 0.06 % nasal spray  latanoprost (XALATAN) 0.005 % ophthalmic solution  LORazepam (ATIVAN) 1 MG tablet  losartan (COZAAR) 50 MG tablet  magnesium oxide (MAG-OX) 400 (241.3 Mg) MG tablet  mirtazapine (REMERON) 7.5 MG tablet  multivitamin, therapeutic (THERA-VIT) TABS tablet  nitroGLYcerin (NITROSTAT) 0.4 MG sublingual tablet  NONFORMULARY  omeprazole (PRILOSEC) 40 MG DR capsule  oxyCODONE (ROXICODONE) 5 MG tablet  rivaroxaban ANTICOAGULANT (XARELTO) 20 MG TABS tablet  vitamin C (ASCORBIC ACID) 1000 MG TABS        Surgical History   Past Surgical History:   Procedure Laterality Date    ARTHROPLASTY PATELLO-FEMORAL (KNEE)  11/2020    CARDIAC SURGERY      2 stents for MI    CHOLECYSTECTOMY      CV CORONARY ANGIOGRAM N/A 11/7/2020    Procedure: Heart Catheterization with Possible Intervention;  Surgeon: Rocio Cardenas MD;  Location:  HEART CARDIAC CATH LAB    EP ICD N/A 11/8/2020    Procedure: Implantable Cardioverter-Defibrillator (ICD);  Surgeon: Amari Jacob MD;  Location:  HEART CARDIAC CATH LAB    GENITOURINARY SURGERY      vasectomy    HERNIA REPAIR      ORTHOPEDIC SURGERY      left hip    right knee replacement Right        Physical Exam     Patient Vitals for the past 24 hrs:   BP Temp Temp  src Pulse Resp SpO2   11/22/24 1923 (!) 147/86 97.2  F (36.2  C) Temporal 60 18 97 %     Physical Exam  Constitutional:       General: Not in acute distress.     Appearance: Normal appearance.   HENT:      Head: Normocephalic and atraumatic.   Eyes:      Extraocular Movements: Extraocular movements intact.      Conjunctiva/sclera: Conjunctivae normal.   Cardiovascular:      Rate and Rhythm: Regular rate.  Paced rhythm.  Pulmonary:      Effort: Pulmonary effort is normal. No respiratory distress.      Breath sounds: Normal breath sounds.  Abdominal:      General: Abdomen is flat. There is no distension.      Palpations: Abdomen is soft.      Tenderness: There is no abdominal tenderness.   Musculoskeletal:      Cervical back: Normal range of motion. No rigidity.      Right lower leg: No edema.      Left lower leg: No edema.   Skin:     General: Skin is warm and dry.   Neurological:      General: No focal deficit present.      Mental Status: Alert and oriented to person, place, and time.   Psychiatric:         Mood and Affect: Mood normal.         Behavior: Behavior normal.    Diagnostics     Lab Results   Labs Ordered and Resulted from Time of ED Arrival to Time of ED Departure   BASIC METABOLIC PANEL - Abnormal       Result Value    Sodium 141      Potassium 3.8      Chloride 103      Carbon Dioxide (CO2) 28      Anion Gap 10      Urea Nitrogen 16.1      Creatinine 1.00      GFR Estimate 78      Calcium 9.1      Glucose 118 (*)    CBC WITH PLATELETS AND DIFFERENTIAL - Abnormal    WBC Count 5.4      RBC Count 4.35 (*)     Hemoglobin 14.4      Hematocrit 43.7       (*)     MCH 33.1 (*)     MCHC 33.0      RDW 12.8      Platelet Count 182      % Neutrophils 59      % Lymphocytes 24      % Monocytes 15      % Eosinophils 2      % Basophils 0      % Immature Granulocytes 0      NRBCs per 100 WBC 0      Absolute Neutrophils 3.2      Absolute Lymphocytes 1.3      Absolute Monocytes 0.8      Absolute Eosinophils 0.1       Absolute Basophils 0.0      Absolute Immature Granulocytes 0.0      Absolute NRBCs 0.0     TROPONIN T, HIGH SENSITIVITY - Normal    Troponin T, High Sensitivity 8     TROPONIN T, HIGH SENSITIVITY - Normal    Troponin T, High Sensitivity 8         Imaging   XR Chest 2 Views   Final Result   IMPRESSION: No acute abnormality in the chest or interval change. Stable left cardiac pacer/ventilator. Mild cardia megaly. Lungs are clear. No pleural effusion or pneumothorax. Mild elevation of left hemidiaphragm, stable. Prior cholecystectomy.          EKG   ECG results from 11/06/20   EKG 12-lead, tracing only     Value    Interpretation ECG Click View Image link to view waveform and result   EKG 12-lead, tracing only     Value    Interpretation ECG Click View Image link to view waveform and result     See ED course for independent interpretation of EKG    Independent Interpretation   See ED course    ED Course      Medications Administered   Medications - No data to display    Procedures   Procedures     Discussion of Management   See ED course    ED Course   ED Course as of 11/23/24 0026 Fri Nov 22, 2024 2133 XR Chest 2 Views  On my independent interpretation of chest x-ray, there is no obvious focal opacity, mediastinal widening, or pneumothorax.  Multiple loops of gas containing bowels.   2133 EKG 12-lead, tracing only  Atrial paced rhythm.  Rate of 61.  .  .  QTc 450.  No acute ST elevation or depression as compared with 11/9/2020 EKG.  Nonspecific ST changes and T wave inversions in inferior lateral leads similar as compared with prior EKG.   2358 Extensive discussion regarding patient's lab and image findings.  No evidence of ACS at this time.  Was not able to receive verbal report from [employer] representative and faxed over report without clear summary page.  Patient does have history of multiple cardiac arrest secondary to prior inferior MI and subsequent scarring.  High risk heart score.   Recommend overnight observation for further cardiac monitoring and potential next day repeat echocardiogram for evaluation for new wall motion abnormality.  Patient will converse with wife for final disposition.   Sat Nov 23, 2024   0009 Extensive discussion shared decision discussion, patient agrees with observation admission overnight.   0013 Discussed patient with hospitalist Dr. Monsivais who accepted patient for admission.       Additional Documentation  None    Medical Decision Making / Diagnosis     CMS Diagnoses: None    MIPS       None    MDM   Candido Doe is a 74 year old male with significant cardiac history presents to the emergency department with left-sided chest pain/pressure.  Patient hemodynamically stable at time of evaluation.  Afebrile.  Paced rhythm on cardiac monitoring.  Patient endorses intermittent chest pressure on the left side.  No associated shortness of breath.  History of cardiac arrest and STEMI.  Differential diagnosis considered includes, but not limited to, unstable angina and ACS.  Presentation less consistent with dissection especially given no tearing type sharp chest pain and pain is not constant and less likely pulmonary embolism given patient already on anticoagulation.  Cardiac workup ordered.  Low threshold for observation admission for further cardiac evaluation and workup especially given patient's extensive prior cardiac history.  Chest x-ray.  Interrogate pacemaker.  Discussed care plan with patient who voiced understanding and agreement with plan.  Answered all questions.  Additional workup and orders as listed in chart.    Ultimately, work up shows no evidence of ACS.  2 set cardiac enzyme flat at 8.  Chest x-ray unremarkable.  Nonetheless, given high risk heart score in addition to history of 2 prior cardiac arrest with 1 secondary to scarring from prior inferior STEMI in addition to unclear pacemaker interrogation report, after shared decision discussion  with the patient, he agrees with observation admission overnight for further cardiac monitoring and further cardiac evaluation next day with potential repeat echocardiogram for evaluation for new wall motion abnormalities.    Please refer to ED course above as part of continuation of MDM for details on the patient's treatment course and any potential changes or updates beyond my initial evaluation and MDM creation.    Disposition   The patient was admitted to the hospital.     Diagnosis     ICD-10-CM    1. Chest pain, unspecified type  R07.9            Discharge Medications   New Prescriptions    No medications on file         DO Lavon ALARCON Ferris, DO  11/23/24 0028

## 2024-11-23 NOTE — PROGRESS NOTES
Observation Goals:    - Serial troponins and stress test complete. - partially met  - Seen and cleared by consultant if applicable - not met; Cardiology consult pending  - Adequate pain control on oral analgesia - met  - Vital signs normal or at patient baseline - met  - Safe disposition plan has been identified - not met

## 2025-03-22 ENCOUNTER — HEALTH MAINTENANCE LETTER (OUTPATIENT)
Age: 75
End: 2025-03-22

## (undated) DEVICE — CATH ANGIO JUDKINS R4 6FRX100CM INFINITI 534621T

## (undated) DEVICE — PACK PCMKR PERM SRG PROC LF SAN32PC573

## (undated) DEVICE — TOTE ANGIO CORP PC15AT SAN32CC83O

## (undated) DEVICE — RAD INTRODUCER KIT MICRO 5FRX10CM .018 NITINOL G/W

## (undated) DEVICE — LEAD ICD COIL  DF4 RELINC  59CM

## (undated) DEVICE — CATH ANGIO JUDKINS JL4 6FRX100CM INFINITI 534620T

## (undated) DEVICE — SHEATH PRELUDE SNAP 13CM 9FR

## (undated) DEVICE — SHEATH PRELUDE SNAP 13CM 6FR

## (undated) DEVICE — MEDITRACE MULTIFUNTION ADULT RADIOTRANSPARENT ELECTRODE FOR ZOLL

## (undated) DEVICE — WIRE GUIDE 0.035"X260CM SAFE-T-J EXCHANGE G00517

## (undated) DEVICE — SLEEVE TR BAND RADIAL COMPRESSION DEVICE 24CM TRB24-REG

## (undated) DEVICE — INTRO GLIDESHEATH SLENDER 6FR 10X45CM 60-1060

## (undated) DEVICE — CATH DIAG 4FR ANG PIG 538453S

## (undated) RX ORDER — NITROGLYCERIN 5 MG/ML
VIAL (ML) INTRAVENOUS
Status: DISPENSED
Start: 2020-11-07

## (undated) RX ORDER — BUPIVACAINE HYDROCHLORIDE 2.5 MG/ML
INJECTION, SOLUTION EPIDURAL; INFILTRATION; INTRACAUDAL
Status: DISPENSED
Start: 2020-11-08

## (undated) RX ORDER — FENTANYL CITRATE 50 UG/ML
INJECTION, SOLUTION INTRAMUSCULAR; INTRAVENOUS
Status: DISPENSED
Start: 2020-11-07

## (undated) RX ORDER — LIDOCAINE HYDROCHLORIDE 10 MG/ML
INJECTION, SOLUTION EPIDURAL; INFILTRATION; INTRACAUDAL; PERINEURAL
Status: DISPENSED
Start: 2020-11-08

## (undated) RX ORDER — FENTANYL CITRATE 50 UG/ML
INJECTION, SOLUTION INTRAMUSCULAR; INTRAVENOUS
Status: DISPENSED
Start: 2020-11-08

## (undated) RX ORDER — LIDOCAINE HYDROCHLORIDE 10 MG/ML
INJECTION, SOLUTION EPIDURAL; INFILTRATION; INTRACAUDAL; PERINEURAL
Status: DISPENSED
Start: 2020-11-07

## (undated) RX ORDER — VERAPAMIL HYDROCHLORIDE 2.5 MG/ML
INJECTION, SOLUTION INTRAVENOUS
Status: DISPENSED
Start: 2020-11-07

## (undated) RX ORDER — HEPARIN SODIUM 1000 [USP'U]/ML
INJECTION, SOLUTION INTRAVENOUS; SUBCUTANEOUS
Status: DISPENSED
Start: 2020-11-07